# Patient Record
Sex: FEMALE | Race: WHITE | Employment: FULL TIME | URBAN - METROPOLITAN AREA
[De-identification: names, ages, dates, MRNs, and addresses within clinical notes are randomized per-mention and may not be internally consistent; named-entity substitution may affect disease eponyms.]

---

## 2018-12-24 ENCOUNTER — HOSPITAL ENCOUNTER (EMERGENCY)
Age: 23
Discharge: HOME | End: 2018-12-24
Attending: EMERGENCY MEDICINE
Payer: MEDICAID

## 2018-12-24 ENCOUNTER — APPOINTMENT (OUTPATIENT)
Dept: GENERAL RADIOLOGY | Age: 23
End: 2018-12-24
Attending: EMERGENCY MEDICINE
Payer: MEDICAID

## 2018-12-24 VITALS
RESPIRATION RATE: 16 BRPM | BODY MASS INDEX: 22.15 KG/M2 | SYSTOLIC BLOOD PRESSURE: 133 MMHG | HEIGHT: 63 IN | OXYGEN SATURATION: 97 % | HEART RATE: 86 BPM | DIASTOLIC BLOOD PRESSURE: 86 MMHG | TEMPERATURE: 97.5 F | WEIGHT: 125 LBS

## 2018-12-24 DIAGNOSIS — B34.9 VIRAL SYNDROME: Primary | ICD-10-CM

## 2018-12-24 DIAGNOSIS — R59.0 CERVICAL LYMPHADENOPATHY: ICD-10-CM

## 2018-12-24 LAB
BACTERIA URNS QL MICRO: ABNORMAL /HPF
BACTERIA URNS QL MICRO: ABNORMAL /HPF
CASTS URNS QL MICRO: ABNORMAL /LPF
CASTS URNS QL MICRO: ABNORMAL /LPF
CRYSTALS URNS QL MICRO: 0 /LPF
CRYSTALS URNS QL MICRO: 0 /LPF
EPI CELLS #/AREA URNS HPF: ABNORMAL /HPF
EPI CELLS #/AREA URNS HPF: ABNORMAL /HPF
HCG UR QL: NEGATIVE
MUCOUS THREADS URNS QL MICRO: 0 /LPF
MUCOUS THREADS URNS QL MICRO: ABNORMAL /LPF
RBC #/AREA URNS HPF: ABNORMAL /HPF
RBC #/AREA URNS HPF: ABNORMAL /HPF
WBC URNS QL MICRO: ABNORMAL /HPF
WBC URNS QL MICRO: ABNORMAL /HPF

## 2018-12-24 PROCEDURE — 96375 TX/PRO/DX INJ NEW DRUG ADDON: CPT | Performed by: EMERGENCY MEDICINE

## 2018-12-24 PROCEDURE — 81015 MICROSCOPIC EXAM OF URINE: CPT

## 2018-12-24 PROCEDURE — 81025 URINE PREGNANCY TEST: CPT

## 2018-12-24 PROCEDURE — 81003 URINALYSIS AUTO W/O SCOPE: CPT | Performed by: EMERGENCY MEDICINE

## 2018-12-24 PROCEDURE — 71046 X-RAY EXAM CHEST 2 VIEWS: CPT

## 2018-12-24 PROCEDURE — 74011250636 HC RX REV CODE- 250/636: Performed by: EMERGENCY MEDICINE

## 2018-12-24 PROCEDURE — 77030011943

## 2018-12-24 PROCEDURE — 99284 EMERGENCY DEPT VISIT MOD MDM: CPT | Performed by: EMERGENCY MEDICINE

## 2018-12-24 PROCEDURE — 96361 HYDRATE IV INFUSION ADD-ON: CPT | Performed by: EMERGENCY MEDICINE

## 2018-12-24 PROCEDURE — 51701 INSERT BLADDER CATHETER: CPT | Performed by: EMERGENCY MEDICINE

## 2018-12-24 PROCEDURE — 96374 THER/PROPH/DIAG INJ IV PUSH: CPT | Performed by: EMERGENCY MEDICINE

## 2018-12-24 RX ORDER — SODIUM CHLORIDE 9 MG/ML
1000 INJECTION, SOLUTION INTRAVENOUS ONCE
Status: COMPLETED | OUTPATIENT
Start: 2018-12-24 | End: 2018-12-24

## 2018-12-24 RX ORDER — KETOROLAC TROMETHAMINE 30 MG/ML
30 INJECTION, SOLUTION INTRAMUSCULAR; INTRAVENOUS
Status: DISCONTINUED | OUTPATIENT
Start: 2018-12-24 | End: 2018-12-24

## 2018-12-24 RX ORDER — ACETAMINOPHEN 325 MG/1
650 TABLET ORAL
Status: DISCONTINUED | OUTPATIENT
Start: 2018-12-24 | End: 2018-12-24 | Stop reason: HOSPADM

## 2018-12-24 RX ORDER — DIPHENHYDRAMINE HYDROCHLORIDE 50 MG/ML
25 INJECTION, SOLUTION INTRAMUSCULAR; INTRAVENOUS
Status: COMPLETED | OUTPATIENT
Start: 2018-12-24 | End: 2018-12-24

## 2018-12-24 RX ORDER — KETOROLAC TROMETHAMINE 30 MG/ML
15 INJECTION, SOLUTION INTRAMUSCULAR; INTRAVENOUS
Status: COMPLETED | OUTPATIENT
Start: 2018-12-24 | End: 2018-12-24

## 2018-12-24 RX ORDER — AMOXICILLIN 500 MG/1
500 TABLET, FILM COATED ORAL 3 TIMES DAILY
Qty: 30 TAB | Refills: 0 | Status: SHIPPED | OUTPATIENT
Start: 2018-12-24 | End: 2020-04-08

## 2018-12-24 RX ORDER — DEXAMETHASONE SODIUM PHOSPHATE 100 MG/10ML
10 INJECTION INTRAMUSCULAR; INTRAVENOUS
Status: COMPLETED | OUTPATIENT
Start: 2018-12-24 | End: 2018-12-24

## 2018-12-24 RX ORDER — AMOXICILLIN 250 MG/5ML
10 POWDER, FOR SUSPENSION ORAL 3 TIMES DAILY
Qty: 300 ML | Refills: 0 | Status: SHIPPED | OUTPATIENT
Start: 2018-12-24 | End: 2020-04-08

## 2018-12-24 RX ADMIN — SODIUM CHLORIDE 1000 ML: 900 INJECTION, SOLUTION INTRAVENOUS at 09:05

## 2018-12-24 RX ADMIN — KETOROLAC TROMETHAMINE 15 MG: 30 INJECTION, SOLUTION INTRAMUSCULAR at 09:03

## 2018-12-24 RX ADMIN — DEXAMETHASONE SODIUM PHOSPHATE 10 MG: 10 INJECTION INTRAMUSCULAR; INTRAVENOUS at 09:03

## 2018-12-24 RX ADMIN — DIPHENHYDRAMINE HYDROCHLORIDE 25 MG: 50 INJECTION, SOLUTION INTRAMUSCULAR; INTRAVENOUS at 12:17

## 2018-12-24 RX ADMIN — SODIUM CHLORIDE 1000 ML: 900 INJECTION, SOLUTION INTRAVENOUS at 11:09

## 2018-12-24 NOTE — ED TRIAGE NOTES
Patient states she was diagnosed with the flu Saturday and was given a prescription tamiflu but did not take it and is now feeling worse with pain and swelling to throat. Patient denies any difficulty swallowing, is handling secretions well and is speaking in full sentences.

## 2018-12-24 NOTE — DISCHARGE INSTRUCTIONS
Amoxicillin 3 times daily for 10 days. Tylenol 500 mg every 6 hours for pain. Ibuprofen 400 mg every 6 hours for pain. You may alternate these every 3 hours for best results. Make a saltwater gargle by placing 1 teaspoon of salt and baking soda into a 16 ounce bottle of water. Shake and gargle every hour or 2 as needed for sore throat pain. Increase fluids! Increase fluids! Over-the-counter saline nasal spray and over-the-counter sinus rinse for nasal congestion. Over-the-counter cough drops  And Mucinex DM for cough if needed. Follow-up with Dr. Meyer in 5-7 days if not improving. Return if any new, worsening or concerning symptoms. Lymphadenitis: Care Instructions  Your Care Instructions  Lymph nodes are small, bean-shaped glands throughout the body. They help the body fight germs and infections. Lymphadenitis is a swelling of a lymph node. It can be caused by an infection or other condition. The infection is most often in a nearby part of the body. A common example is the lumps on both sides of your neck under the jaw that get tender and bigger when you have a cold or sore throat. Sometimes the lymph node itself may be infected. Usually the swollen lymph nodes go back to normal size without a problem. Treatment, if needed, focuses on treating the cause. For example, a bacterial infection may be treated with antibiotics. This should bring the node back to normal size. An infection caused by a virus often goes away on its own. In rare cases, a badly infected node may need to be drained by your doctor. Follow-up care is a key part of your treatment and safety. Be sure to make and go to all appointments, and call your doctor if you are having problems. It's also a good idea to know your test results and keep a list of the medicines you take. How can you care for yourself at home? · Be safe with medicines. ? If your doctor prescribed antibiotics, take them as directed.  Do not stop taking them just because you feel better. You need to take the full course of antibiotics. ? Ask your doctor if you can take an over-the-counter pain medicine, such as acetaminophen (Tylenol), ibuprofen (Advil, Motrin), or naproxen (Aleve). Read and follow all instructions on the label. · If you have pain, try a warm compress. Soak a towel or washcloth in warm water. Wring it out, and place it on the affected skin. · Do not squeeze, drain, or puncture a painful lump. Doing this can irritate or inflame the lump, push any existing infection deeper into the skin, or cause severe bleeding. When should you call for help? Call your doctor now or seek immediate medical care if:    · Your lymph nodes get bigger.     · The area becomes red and feels more tender.     · You have a fever that does not go away.    Watch closely for changes in your health, and be sure to contact your doctor if:    · You do not get better as expected. Where can you learn more? Go to http://kennedy-nikita.info/. Enter P032 in the search box to learn more about \"Lymphadenitis: Care Instructions. \"  Current as of: November 18, 2017  Content Version: 11.8  © 8770-4732 [employer]. Care instructions adapted under license by [employer] (which disclaims liability or warranty for this information). If you have questions about a medical condition or this instruction, always ask your healthcare professional. [place] [address] any warranty or liability for your use of this information. Viral Infections: Care Instructions  Your Care Instructions    You don't feel well, but it's not clear what's causing it. You may have a viral infection. Viruses cause many illnesses, such as the common cold, influenza, fever, rashes, and the diarrhea, nausea, and vomiting that are often called \"stomach flu. \" You may wonder if antibiotic medicines could make you feel better.  But antibiotics only treat infections caused by bacteria. They don't work on viruses. The good news is that viral infections usually aren't serious. Most will go away in a few days without medical treatment. In the meantime, there are a few things you can do to make yourself more comfortable. Follow-up care is a key part of your treatment and safety. Be sure to make and go to all appointments, and call your doctor if you are having problems. It's also a good idea to know your test results and keep a list of the medicines you take. How can you care for yourself at home? · Get plenty of rest if you feel tired. · Take an over-the-counter pain medicine if needed, such as acetaminophen (Tylenol), ibuprofen (Advil, Motrin), or naproxen (Aleve). Read and follow all instructions on the label. · Be careful when taking over-the-counter cold or flu medicines and Tylenol at the same time. Many of these medicines have acetaminophen, which is Tylenol. Read the labels to make sure that you are not taking more than the recommended dose. Too much acetaminophen (Tylenol) can be harmful. · Drink plenty of fluids, enough so that your urine is light yellow or clear like water. If you have kidney, heart, or liver disease and have to limit fluids, talk with your doctor before you increase the amount of fluids you drink. · Stay home from work, school, and other public places while you have a fever. When should you call for help? Call 911 anytime you think you may need emergency care.  For example, call if:    · You have severe trouble breathing.     · You passed out (lost consciousness).    Call your doctor now or seek immediate medical care if:    · You seem to be getting much sicker.     · You have a new or higher fever.     · You have blood in your stools.     · You have new belly pain, or your pain gets worse.     · You have a new rash.    Watch closely for changes in your health, and be sure to contact your doctor if:    · You start to get better and then get worse.     · You do not get better as expected. Where can you learn more? Go to http://kennedy-nikita.info/. Enter I189 in the search box to learn more about \"Viral Infections: Care Instructions. \"  Current as of: November 18, 2017  Content Version: 11.8  © 7197-0542 [employer]. Care instructions adapted under license by [employer] (which disclaims liability or warranty for this information). If you have questions about a medical condition or this instruction, always ask your healthcare professional. [address] any warranty or liability for your use of this information.

## 2018-12-24 NOTE — ED NOTES
I have reviewed discharge instructions with the patient. The patient verbalized understanding. Patient left ED via Discharge Method: ambulatory to Home with family. Opportunity for questions and clarification provided. Patient given 1 scripts. To continue your aftercare when you leave the hospital, you may receive an automated call from our care team to check in on how you are doing. This is a free service and part of our promise to provide the best care and service to meet your aftercare needs.  If you have questions, or wish to unsubscribe from this service please call [phone]. Thank you for Choosing our [address] [place] [address] Emergency Department.

## 2018-12-24 NOTE — ED PROVIDER NOTES
ppatient began getting ill on Friday and was seen here Saturday and diagnosed with influenza. She did not take Tamiflu. Complains of worsening sore throat at this time and trouble breathing. Complains of swollen nodes in her neck. The history is provided by the patient. Sore Throat    This is a new problem. Patient reports a subjective fever - was not measured. Associated symptoms include congestion, shortness of breath and cough. Pertinent negatives include no vomiting. History reviewed. No pertinent past medical history. Past Surgical History:   Procedure Laterality Date    HX HEENT           History reviewed. No pertinent family history. Social History     Socioeconomic History    Marital status: SINGLE     Spouse name: Not on file    Number of children: Not on file    Years of education: Not on file    Highest education level: Not on file   Social Needs    Financial resource strain: Not on file    Food insecurity - worry: Not on file    Food insecurity - inability: Not on file    Transportation needs - medical: Not on file   [employer] needs - non-medical: Not on file   Occupational History    Not on file   Tobacco Use    Smoking status: Never Smoker    Smokeless tobacco: Never Used   Substance and Sexual Activity    Alcohol use: No     Frequency: Never    Drug use: No    Sexual activity: Not on file   Other Topics Concern    Not on file   Social History Narrative    Not on file         ALLERGIES: Sulfa (sulfonamide antibiotics)    Review of Systems   Constitutional: Positive for fever. HENT: Positive for congestion, rhinorrhea and sore throat. Respiratory: Positive for cough and shortness of breath. Gastrointestinal: Negative for vomiting. Skin: Negative for rash.        Vitals:    12/24/18 0844   BP: 132/83   Pulse: 89   Resp: 16   Temp: 97.5 °F (36.4 °C)   SpO2: 97%   Weight: 56.7 kg (125 lb)   Height: 5' 3\" (1.6 m)            Physical Exam Constitutional: She appears well-developed and well-nourished. HENT:   Mouth/Throat: Mucous membranes are normal. No trismus in the jaw. No uvula swelling. Posterior oropharyngeal erythema present. No posterior oropharyngeal edema or tonsillar abscesses. Eyes: Conjunctivae are normal. Pupils are equal, round, and reactive to light. Cardiovascular: Normal rate, regular rhythm and normal heart sounds. Pulmonary/Chest: Effort normal and breath sounds normal.   Abdominal: Soft. She exhibits no distension. There is no tenderness. Musculoskeletal: She exhibits no edema. Neurological: She is alert. Nursing note and vitals reviewed. MDM  Number of Diagnoses or Management Options  Diagnosis management comments: Decadron for lymphadenopathy. Toradol for pain. IV hydration and sore throat and probable poor by mouth intake. Chest x-ray to exclude bacterial pneumonia with complaint of shortness of breath. Vitals stable. No sign of peritonsillar abscess. 12:24 PM  Repeat catheter urine specimen negative for UTI.        Amount and/or Complexity of Data Reviewed  Tests in the radiology section of CPT®: ordered and reviewed           Procedures

## 2018-12-24 NOTE — ED NOTES
I have reviewed discharge instructions with the patient and spouse. The patient and spouse verbalized understanding. Patient left ED via Discharge Method: ambulatory to Home with family. Opportunity for questions and clarification provided. Patient given 1 scripts. No e-sign. To continue your aftercare when you leave the hospital, you may receive an automated call from our care team to check in on how you are doing. This is a free service and part of our promise to provide the best care and service to meet your aftercare needs.  If you have questions, or wish to unsubscribe from this service please call [phone]. Thank you for Choosing our [facility] Emergency Department.

## 2019-10-02 ENCOUNTER — APPOINTMENT (OUTPATIENT)
Dept: GENERAL RADIOLOGY | Age: 24
End: 2019-10-02
Attending: EMERGENCY MEDICINE
Payer: MEDICAID

## 2019-10-02 ENCOUNTER — HOSPITAL ENCOUNTER (EMERGENCY)
Age: 24
Discharge: HOME | End: 2019-10-03
Attending: EMERGENCY MEDICINE
Payer: MEDICAID

## 2019-10-02 DIAGNOSIS — M24.411 RECURRENT SHOULDER DISLOCATION, RIGHT: Primary | ICD-10-CM

## 2019-10-02 LAB — GLUCOSE BLD STRIP.AUTO-MCNC: 103 MG/DL (ref 65–100)

## 2019-10-02 PROCEDURE — L3650 SO 8 ABD RESTRAINT PRE OTS: HCPCS | Performed by: EMERGENCY MEDICINE

## 2019-10-02 PROCEDURE — 73020 X-RAY EXAM OF SHOULDER: CPT

## 2019-10-02 PROCEDURE — 82962 GLUCOSE BLOOD TEST: CPT

## 2019-10-02 PROCEDURE — 96376 TX/PRO/DX INJ SAME DRUG ADON: CPT | Performed by: EMERGENCY MEDICINE

## 2019-10-02 PROCEDURE — 73030 X-RAY EXAM OF SHOULDER: CPT

## 2019-10-02 PROCEDURE — 74011250636 HC RX REV CODE- 250/636: Performed by: EMERGENCY MEDICINE

## 2019-10-02 PROCEDURE — 99152 MOD SED SAME PHYS/QHP 5/>YRS: CPT | Performed by: EMERGENCY MEDICINE

## 2019-10-02 PROCEDURE — 96374 THER/PROPH/DIAG INJ IV PUSH: CPT | Performed by: EMERGENCY MEDICINE

## 2019-10-02 PROCEDURE — 96375 TX/PRO/DX INJ NEW DRUG ADDON: CPT | Performed by: EMERGENCY MEDICINE

## 2019-10-02 PROCEDURE — 99285 EMERGENCY DEPT VISIT HI MDM: CPT | Performed by: EMERGENCY MEDICINE

## 2019-10-02 PROCEDURE — 75810000301 HC ER LEVEL 1 CLOSED TREATMNT FRACTURE/DISLOCATION: Performed by: EMERGENCY MEDICINE

## 2019-10-02 RX ORDER — MORPHINE SULFATE 4 MG/ML
4 INJECTION INTRAVENOUS
Status: COMPLETED | OUTPATIENT
Start: 2019-10-02 | End: 2019-10-02

## 2019-10-02 RX ORDER — ONDANSETRON 2 MG/ML
4 INJECTION INTRAMUSCULAR; INTRAVENOUS
Status: COMPLETED | OUTPATIENT
Start: 2019-10-02 | End: 2019-10-02

## 2019-10-02 RX ORDER — KETOROLAC TROMETHAMINE 10 MG/1
10 TABLET, FILM COATED ORAL
Qty: 15 TAB | Refills: 0 | Status: SHIPPED | OUTPATIENT
Start: 2019-10-02 | End: 2020-04-08

## 2019-10-02 RX ORDER — PROPOFOL 10 MG/ML
100 INJECTION, EMULSION INTRAVENOUS
Status: COMPLETED | OUTPATIENT
Start: 2019-10-02 | End: 2019-10-02

## 2019-10-02 RX ORDER — FENTANYL CITRATE 50 UG/ML
75 INJECTION, SOLUTION INTRAMUSCULAR; INTRAVENOUS
Status: COMPLETED | OUTPATIENT
Start: 2019-10-02 | End: 2019-10-02

## 2019-10-02 RX ADMIN — ONDANSETRON 4 MG: 2 INJECTION INTRAMUSCULAR; INTRAVENOUS at 23:49

## 2019-10-02 RX ADMIN — PROPOFOL 30 MG: 10 INJECTION, EMULSION INTRAVENOUS at 22:55

## 2019-10-02 RX ADMIN — MORPHINE SULFATE 4 MG: 4 INJECTION INTRAVENOUS at 22:08

## 2019-10-02 RX ADMIN — FENTANYL CITRATE 75 MCG: 50 INJECTION INTRAMUSCULAR; INTRAVENOUS at 22:45

## 2019-10-02 RX ADMIN — ONDANSETRON 4 MG: 2 INJECTION INTRAMUSCULAR; INTRAVENOUS at 22:07

## 2019-10-02 RX ADMIN — PROPOFOL 30 MG: 10 INJECTION, EMULSION INTRAVENOUS at 23:00

## 2019-10-02 NOTE — LETTER
[address] [address] EMERGENCY DEPT 
[address] [facility] [id number] 
[phone] Work/School Note Date: 10/2/2019 To Whom It May concern: 
 
Dionicio Cody was seen and treated today in the emergency room by the following provider(s): 
Attending Provider: Pacheco Carroll MD. Dionicio Cody may return to work on Sunday October 6th 2019. Sincerely, Nayla Way RN

## 2019-10-03 VITALS
SYSTOLIC BLOOD PRESSURE: 112 MMHG | HEIGHT: 63 IN | OXYGEN SATURATION: 99 % | BODY MASS INDEX: 25.69 KG/M2 | RESPIRATION RATE: 18 BRPM | WEIGHT: 145 LBS | DIASTOLIC BLOOD PRESSURE: 75 MMHG | HEART RATE: 84 BPM | TEMPERATURE: 98.4 F

## 2019-10-03 NOTE — ED NOTES
Patient sedated with reduction with no significant issues. Patient tolerated sedation well (propophol as given by MD per [facility]) with easy reduction by MD with assistance from Kassandra Jeter. Patient with no significant drop in oxygen saturation, capnography or BP. Shoulder immobilizer placed. Patient restful in bed at current awaiting end of sedation effects.

## 2019-10-03 NOTE — DISCHARGE INSTRUCTIONS
Sedation for a Medical Procedure: Care Instructions    You were given a sedative medication during your ED visit. While many of the effects will have worn   off before you leave; you may continue to feel some effects for several hours. Common side effects from sedation include:  Feeling sleepy. (Your doctors and nurses will make sure you are not too sleepy to go home.)  Nausea and vomiting. This usually does not last long. Feeling tired. How can you care for yourself at home? Activity    Don't do anything for 24 hours that requires attention to detail. It takes time for the medicine effects to completely wear off. Do not make important legal decisions for 24 hours. Do not sign any legal documents for 24 hours. Do not drink alcohol today     For your safety, you should not drive or operate heavy machinery for the remainder of the day     Rest when you feel tired. Getting enough sleep will help you recover. Diet    You can eat your normal diet, unless your doctor gives you other instructions. If your stomach is upset, try clear liquids and bland, low-fat foods like plain toast or rice. Drink plenty of fluids (unless your doctor tells you not to). Don't drink alcohol for 24 hours. Medicines    Be safe with medicines. Read and follow all instructions on the label. If the doctor gave you a prescription medicine for pain, take it as prescribed. If you are not taking a prescription pain medicine, ask your doctor if you can take an over-the-counter medicine. If you think your pain medicine is making you sick to your stomach: Take your medicine after meals (unless your doctor has told you not to). Ask your doctor for a different pain medicine. When should you call for help? Call 911 anytime you think you may need emergency care. For example, call if:    You have severe trouble breathing. You passed out (lost consciousness).       Call your doctor now or seek immediate medical care if:    You have trouble breathing. You have ongoing or worsening nausea or vomiting. You have a fever. You have a new or worse headache. The medicine is not wearing off and you can't think clearly. Watch closely for changes in your health, and be sure to contact your doctor if:    You do not get better as expected. Follow-up care is a key part of your treatment and safety. Be sure to make and go to all   appointments, and call your doctor if you are having problems. It's also a good idea to know your   test results and keep a list of the medicines you take. Where can you learn more? Go to http://kennedy-nikita.info/. \

## 2019-10-03 NOTE — ED PROVIDER NOTES
[address] Alvino Pressley is a 30 y.o. female seen on 10/2/2019 at 10:39 PM in the [facility] EMERGENCY DEPT in room FT10/10. Chief Complaint   Patient presents with    Shoulder Injury     HPI: 49-year-old female presenting to the emergency department for right shoulder dislocation. She states today she was walking on uneven ground when she fell. She fell on her outstretched arm and immediately felt displacement of her humeral head. She has a history of multiple dislocations in the past.  She has had rotator cuff surgery in the past as well. She is from out of town but recently moved to the [place]. She states that since her surgery she is usually been able to relocate her shoulder at home by herself, however today she is been unable to do it. Her shoulders been out for approximately 1 hour. She denies any numbness or tingling. She has decreased range of motion her right shoulder. Pain is worse with attempted movement. She denies any other injury from the fall. Historian: Patient    REVIEW OF SYSTEMS     Review of Systems   Constitutional: Negative for fever. HENT: Negative. Eyes: Negative. Respiratory: Negative for cough, chest tightness, shortness of breath and wheezing. Cardiovascular: Negative for chest pain. Gastrointestinal: Negative for abdominal distention, abdominal pain, constipation, diarrhea and vomiting. Endocrine: Negative. Genitourinary: Negative for dysuria, flank pain, frequency and urgency. Neurological: Negative for dizziness, syncope and headaches. Psychiatric/Behavioral: Negative. All other systems reviewed and are negative. PAST MEDICAL HISTORY     No past medical history on file.   Past Surgical History:   Procedure Laterality Date    HX HEENT       Social History     Socioeconomic History    Marital status:      Spouse name: Not on file    Number of children: Not on file    Years of education: Not on file    Highest education level: Not on file   Tobacco Use    Smoking status: Never Smoker    Smokeless tobacco: Never Used   Substance and Sexual Activity    Alcohol use: No     Frequency: Never    Drug use: No     Prior to Admission Medications   Prescriptions Last Dose Informant Patient Reported? Taking?   amoxicillin (AMOXIL) 250 mg/5 mL suspension   No No   Sig: Take 10 mL by mouth three (3) times daily. amoxicillin 500 mg tab   No No   Sig: Take 500 mg by mouth three (3) times daily. Facility-Administered Medications: None     Allergies   Allergen Reactions    Peanuts [Peanut Oil] Hives    Sulfa (Sulfonamide Antibiotics) Hives        PHYSICAL EXAM       Vitals:    10/02/19 2137   BP: 111/63   Pulse: (!) 116   Resp: 20   Temp: 98.4 °F (36.9 °C)   SpO2: 100%    Vital signs were reviewed. Physical Exam   Constitutional: She is oriented to person, place, and time. She appears well-developed and well-nourished. No distress. HENT:   Head: Normocephalic and atraumatic. Eyes: Pupils are equal, round, and reactive to light. EOM are normal.   Neck: Normal range of motion. Neck supple. Cardiovascular: Normal rate, regular rhythm, normal heart sounds and intact distal pulses. Exam reveals no gallop and no friction rub. No murmur heard. Pulmonary/Chest: Effort normal and breath sounds normal. No stridor. No respiratory distress. She has no wheezes. Abdominal: Soft. Bowel sounds are normal. She exhibits no distension and no mass. There is no tenderness. There is no rebound and no guarding. Musculoskeletal: She exhibits no edema. Right shoulder: She exhibits decreased range of motion, tenderness (anterior AC ), bony tenderness, deformity (loss of rounding of shoulder) and pain. She exhibits no swelling, no effusion, no crepitus, no spasm, normal pulse and normal strength.         Right elbow: Normal.       Right wrist: Normal.   Patient intact in all distributions of the right hand, 5+ strength  strength right hand, range of motion at the elbow is normal   Neurological: She is alert and oriented to person, place, and time. No sensory deficit. No focal neuro deficits   Skin: Skin is warm and dry. No rash noted. She is not diaphoretic. No erythema. Psychiatric: She has a normal mood and affect. Her behavior is normal.   Vitals reviewed. MEDICAL DECISION MAKING     MDM  Procedural Sedation  Date/Time: 10/2/2019 10:41 PM  Performed by: Kristian Oneil MD  Authorized by: Kristian Oneil MD     Consent:     Consent obtained:  Verbal and written    Consent given by:  Patient    Risks discussed:   Allergic reaction, prolonged hypoxia resulting in organ damage, prolonged sedation necessitating reversal, dysrhythmia, inadequate sedation, respiratory compromise necessitating ventilatory assistance and intubation, vomiting and nausea    Alternatives discussed:  Analgesia without sedation, anxiolysis and regional anesthesia  Indications:     Procedure performed:  Dislocation reduction    Procedure necessitating sedation performed by:  Physician performing sedation    Intended level of sedation:  Moderate (conscious sedation)  Pre-sedation assessment:     ASA classification: class 1 - normal, healthy patient      Neck mobility: normal      Mouth opening:  3 or more finger widths    Thyromental distance:  4 finger widths    Mallampati score:  I - soft palate, uvula, fauces, pillars visible    Pre-sedation assessments completed and reviewed: airway patency, cardiovascular function, mental status, nausea/vomiting, respiratory function and temperature      History of difficult intubation: no    Immediate pre-procedure details:     Reviewed: vital signs, relevant labs/tests and NPO status      Verified: bag valve mask available, emergency equipment available, intubation equipment available, IV patency confirmed, oxygen available and suction available    Procedure details (see MAR for exact dosages):     Preoxygenation:  Nasal cannula    Sedation:  Propofol    Analgesia:  Fentanyl    Intra-procedure monitoring:  Blood pressure monitoring, cardiac monitor, continuous capnometry, continuous pulse oximetry, frequent LOC assessments and frequent vital sign checks    Intra-procedure events: none    Post-procedure details:     Attendance: Constant attendance by certified staff until patient recovered      Recovery: Patient returned to pre-procedure baseline      Post-sedation assessments completed and reviewed: airway patency, cardiovascular function, hydration status, mental status, nausea/vomiting, pain level and respiratory function      Patient is stable for discharge or admission: yes      Patient tolerance: Tolerated well, no immediate complications    Reduction of Joint  Date/Time: 10/12/2019 7:18 AM  Performed by: Maikel Joel MD  Authorized by: Maikel Joel MD     Consent:     Consent obtained:  Written    Consent given by:  Patient    Risks discussed:  Nerve damage, pain and vascular damage    Alternatives discussed:  No treatment and delayed treatment  Injury:     Injury location:  Shoulder    Shoulder injury location:  R shoulder    Hill-Sachs deformity: no    Pre-procedure assessment:     Neurological function: normal      Distal perfusion: normal      Range of motion: normal    Sedation:     Sedation type: Moderate (conscious) sedation  Procedure details:     Manipulation performed: yes      Skeletal traction used: yes      Reduction successful: yes      X-ray confirmed reduction: yes      Immobilization:  Sling  Post-procedure assessment:     Neurological function: normal      Distal perfusion: normal      Range of motion: normal      Patient tolerance of procedure: Tolerated well, no immediate complications      ED Course: Moderate sedation performed and shoulder was reduced without complication. Postreduction film will be obtained.     11:24 PM  Post reduction film shows appropriately relocated shoulder joint    Disposition:  Dc home  Diagnosis:  R shoulder dislocation  ____________________________________________________________________  A portion of this note was generated using voice recognition dictation software. While the note has been reviewed for accuracy, please note certain words and phrases may not be transcribed as intended and some grammatical and/or typographical errors may be present.

## 2019-10-03 NOTE — ED NOTES
Patient in obvious distress, awaiting MD hargrove. PIV placed on standing order. Order for 4mg morphine, 4mg zofran given by Nicho Trimble. Will administer ahead of probably conscious sedation for reduction.

## 2019-10-03 NOTE — ED TRIAGE NOTES
Pt presents to the ED with L shoulder pain,  Reports she had rotator cuff surgery x 1 year ago and has had dislocation before to shoulder post surgery.

## 2019-10-03 NOTE — ED NOTES
Patient off floor to XR. Patient states unable to void but hasn't been sexually active in many months and is sure she isn't pregnant. Pt states she'll sign waiver for radiology.

## 2019-10-03 NOTE — ED NOTES
I have reviewed discharge instructions with the patient. The patient verbalized understanding. Patient to follow up with ortho as referred and RTED with any changes/concerns. Patient expresses understanding. Patient assisted to wheelchair to vehicle. Patient's friend to drive her home. Patient advised she needs to wear shoulder immobilizer until seen by ortho. Patient expresses understanding. Patient from ED in NAD with Rx 1.

## 2019-10-03 NOTE — ED NOTES
First contact with patient. Patient reports a fall resulting in dislocated shoulder. Patient advises this has been a frequent occurrence since rotator cuff surgery. States she can usually self reduce but has been unable to.

## 2019-10-03 NOTE — ED NOTES
Patient with some nausea post sedation- medicated with additional 4mg zofran. Otherwise, patient continues to become more awake/alert. VSS remain stable. Will plan to discharge in appx 10-15 minutes assuming no changes.

## 2019-12-03 ENCOUNTER — HOSPITAL ENCOUNTER (EMERGENCY)
Age: 24
Discharge: HOME | End: 2019-12-03
Payer: MEDICAID

## 2019-12-03 VITALS
HEART RATE: 89 BPM | WEIGHT: 140 LBS | BODY MASS INDEX: 24.8 KG/M2 | TEMPERATURE: 97.7 F | OXYGEN SATURATION: 97 % | DIASTOLIC BLOOD PRESSURE: 80 MMHG | RESPIRATION RATE: 16 BRPM | HEIGHT: 63 IN | SYSTOLIC BLOOD PRESSURE: 120 MMHG

## 2019-12-03 DIAGNOSIS — N30.00 ACUTE CYSTITIS WITHOUT HEMATURIA: Primary | ICD-10-CM

## 2019-12-03 LAB
BACTERIA URNS QL MICRO: ABNORMAL /HPF
CASTS URNS QL MICRO: 0 /LPF
CRYSTALS URNS QL MICRO: 0 /LPF
EPI CELLS #/AREA URNS HPF: ABNORMAL /HPF
MUCOUS THREADS URNS QL MICRO: 0 /LPF
OTHER OBSERVATIONS,UCOM: ABNORMAL
RBC #/AREA URNS HPF: ABNORMAL /HPF
WBC URNS QL MICRO: ABNORMAL /HPF

## 2019-12-03 PROCEDURE — 51701 INSERT BLADDER CATHETER: CPT

## 2019-12-03 PROCEDURE — 81015 MICROSCOPIC EXAM OF URINE: CPT

## 2019-12-03 PROCEDURE — 77030011943

## 2019-12-03 PROCEDURE — 74011250637 HC RX REV CODE- 250/637

## 2019-12-03 PROCEDURE — 99283 EMERGENCY DEPT VISIT LOW MDM: CPT

## 2019-12-03 RX ORDER — HYDROCODONE BITARTRATE AND ACETAMINOPHEN 5; 325 MG/1; MG/1
1 TABLET ORAL
Qty: 6 TAB | Refills: 0 | Status: SHIPPED | OUTPATIENT
Start: 2019-12-03 | End: 2019-12-05

## 2019-12-03 RX ORDER — PHENAZOPYRIDINE HYDROCHLORIDE 95 MG/1
200 TABLET ORAL
Status: COMPLETED | OUTPATIENT
Start: 2019-12-03 | End: 2019-12-03

## 2019-12-03 RX ORDER — AMOXICILLIN AND CLAVULANATE POTASSIUM 875; 125 MG/1; MG/1
1 TABLET, FILM COATED ORAL 2 TIMES DAILY
Qty: 14 TAB | Refills: 0 | Status: SHIPPED | OUTPATIENT
Start: 2019-12-03 | End: 2020-04-08

## 2019-12-03 RX ADMIN — URINARY PAIN RELIEF 190 MG: 95 TABLET ORAL at 05:50

## 2019-12-03 NOTE — ED NOTES
I have reviewed discharge instructions with the patient. The patient verbalized understanding. Patient left ED via Discharge Method: ambulatory to Home with self. Opportunity for questions and clarification provided. Patient given 2 scripts. To continue your aftercare when you leave the hospital, you may receive an automated call from our care team to check in on how you are doing. This is a free service and part of our promise to provide the best care and service to meet your aftercare needs.  If you have questions, or wish to unsubscribe from this service please call [phone]. Thank you for Choosing our [facility] Emergency Department.

## 2019-12-03 NOTE — DISCHARGE INSTRUCTIONS

## 2019-12-03 NOTE — ED TRIAGE NOTES
Pt arrives reporting possible uti. States feeling the need to urinate, but unable to get much out and dysuria. States it is uncomfortable to sit down. Reports symptoms started yesterday. Pt reports hx kidney stones, and stent in kidney but unsure which one. States stent was place din 2018 and has not had a stone since then.

## 2019-12-03 NOTE — ED NOTES
Pt crying in room. Straight cath performed by this RN with assistance of PRATEEK Yung. When catheter inserted pt screamed and started rolling over in bed. Minimal amount of urine obtained. Sent to lab.

## 2019-12-04 NOTE — ED PROVIDER NOTES
53-year-old female complaining of pain with urination. Patient started having symptoms 2 days ago but does get the point where she is having urgency frequency and burning upon urination. Patient has had several UTIs in the past and states it feels the same. Bladder Infection    This is a recurrent problem. The current episode started more than 2 days ago. The problem occurs every urination. The pain is at a severity of 10/10. The pain is severe. There has been no fever. She is sexually active. Pertinent negatives include no chills. No past medical history on file. Past Surgical History:   Procedure Laterality Date    HX HEENT           No family history on file.     Social History     Socioeconomic History    Marital status:      Spouse name: Not on file    Number of children: Not on file    Years of education: Not on file    Highest education level: Not on file   Occupational History    Not on file   Social Needs    Financial resource strain: Not on file    Food insecurity:     Worry: Not on file     Inability: Not on file    Transportation needs:     Medical: Not on file     Non-medical: Not on file   Tobacco Use    Smoking status: Never Smoker    Smokeless tobacco: Never Used   Substance and Sexual Activity    Alcohol use: No     Frequency: Never    Drug use: No    Sexual activity: Not on file   Lifestyle    Physical activity:     Days per week: Not on file     Minutes per session: Not on file    Stress: Not on file   Relationships    Social connections:     Talks on phone: Not on file     Gets together: Not on file     Attends Jehovah's witness service: Not on file     Active member of club or organization: Not on file     Attends meetings of clubs or organizations: Not on file     Relationship status: Not on file    Intimate partner violence:     Fear of current or ex partner: Not on file     Emotionally abused: Not on file     Physically abused: Not on file     Forced sexual activity: Not on file   Other Topics Concern    Not on file   Social History Narrative    Not on file         ALLERGIES: Peanuts [peanut oil] and Sulfa (sulfonamide antibiotics)    Review of Systems   Constitutional: Negative. Negative for activity change and chills. HENT: Negative. Eyes: Negative. Respiratory: Negative. Cardiovascular: Negative. Gastrointestinal: Negative. Genitourinary: Negative. Musculoskeletal: Negative. Skin: Negative. Neurological: Negative. Psychiatric/Behavioral: Negative. All other systems reviewed and are negative. Vitals:    12/03/19 0517 12/03/19 0643   BP: 122/77 120/80   Pulse: 89    Resp: 18 16   Temp: 97.7 °F (36.5 °C) 97.7 °F (36.5 °C)   SpO2: 96% 97%   Weight: 63.5 kg (140 lb)    Height: 5' 3\" (1.6 m)             Physical Exam  Vitals signs and nursing note reviewed. Constitutional:       General: She is not in acute distress. Appearance: She is well-developed. She is not diaphoretic. HENT:      Head: Normocephalic and atraumatic. Right Ear: External ear normal.      Left Ear: External ear normal.      Nose: Nose normal.      Mouth/Throat:      Pharynx: No oropharyngeal exudate. Eyes:      General: No scleral icterus. Right eye: No discharge. Left eye: No discharge. Conjunctiva/sclera: Conjunctivae normal.      Pupils: Pupils are equal, round, and reactive to light. Neck:      Musculoskeletal: Normal range of motion and neck supple. Vascular: No JVD. Trachea: No tracheal deviation. Cardiovascular:      Rate and Rhythm: Normal rate and regular rhythm. Pulmonary:      Effort: Pulmonary effort is normal. No respiratory distress. Breath sounds: Normal breath sounds. No stridor. No wheezing. Chest:      Chest wall: No tenderness. Abdominal:      General: Bowel sounds are normal. There is no distension. Palpations: Abdomen is soft. There is no mass. Tenderness:  There is no tenderness. Musculoskeletal: Normal range of motion. General: No tenderness. Skin:     General: Skin is warm and dry. Coloration: Skin is not pale. Findings: No erythema or rash. Neurological:      Mental Status: She is alert and oriented to person, place, and time. Cranial Nerves: No cranial nerve deficit. Psychiatric:         Behavior: Behavior normal.         Thought Content: Thought content normal.          MDM  Number of Diagnoses or Management Options  Acute cystitis without hematuria:   Diagnosis management comments: Patient had difficulty giving urine straight cath was attempted small amount of urine returned. Positive for UTI will treat with antibiotics and Pyridium.        Amount and/or Complexity of Data Reviewed  Clinical lab tests: reviewed and ordered           Procedures

## 2020-04-08 ENCOUNTER — HOSPITAL ENCOUNTER (EMERGENCY)
Age: 25
Discharge: HOME | End: 2020-04-08
Attending: EMERGENCY MEDICINE
Payer: MEDICAID

## 2020-04-08 VITALS
HEART RATE: 87 BPM | OXYGEN SATURATION: 97 % | TEMPERATURE: 97.6 F | SYSTOLIC BLOOD PRESSURE: 120 MMHG | RESPIRATION RATE: 16 BRPM | DIASTOLIC BLOOD PRESSURE: 74 MMHG

## 2020-04-08 DIAGNOSIS — N39.0 URINARY TRACT INFECTION WITHOUT HEMATURIA, SITE UNSPECIFIED: Primary | ICD-10-CM

## 2020-04-08 LAB
BACTERIA URNS QL MICRO: ABNORMAL /HPF
CASTS URNS QL MICRO: 0 /LPF
CRYSTALS URNS QL MICRO: ABNORMAL /LPF
EPI CELLS #/AREA URNS HPF: ABNORMAL /HPF
HCG UR QL: NEGATIVE
MUCOUS THREADS URNS QL MICRO: ABNORMAL /LPF
RBC #/AREA URNS HPF: ABNORMAL /HPF
WBC URNS QL MICRO: ABNORMAL /HPF

## 2020-04-08 PROCEDURE — 81025 URINE PREGNANCY TEST: CPT

## 2020-04-08 PROCEDURE — 81003 URINALYSIS AUTO W/O SCOPE: CPT

## 2020-04-08 PROCEDURE — 99284 EMERGENCY DEPT VISIT MOD MDM: CPT

## 2020-04-08 PROCEDURE — 81015 MICROSCOPIC EXAM OF URINE: CPT

## 2020-04-08 PROCEDURE — 74011250637 HC RX REV CODE- 250/637: Performed by: PHYSICIAN ASSISTANT

## 2020-04-08 PROCEDURE — 99285 EMERGENCY DEPT VISIT HI MDM: CPT

## 2020-04-08 RX ORDER — PHENAZOPYRIDINE HYDROCHLORIDE 95 MG/1
200 TABLET ORAL
Status: COMPLETED | OUTPATIENT
Start: 2020-04-08 | End: 2020-04-08

## 2020-04-08 RX ORDER — CEPHALEXIN 500 MG/1
500 CAPSULE ORAL 4 TIMES DAILY
Qty: 20 CAP | Refills: 0 | Status: SHIPPED | OUTPATIENT
Start: 2020-04-08 | End: 2020-04-13

## 2020-04-08 RX ORDER — PHENAZOPYRIDINE HYDROCHLORIDE 200 MG/1
200 TABLET, FILM COATED ORAL 3 TIMES DAILY
Qty: 6 TAB | Refills: 0 | Status: SHIPPED | OUTPATIENT
Start: 2020-04-08 | End: 2020-04-10

## 2020-04-08 RX ADMIN — URINARY PAIN RELIEF 190 MG: 95 TABLET ORAL at 12:18

## 2020-04-08 NOTE — PROGRESS NOTES
SW met with patient who confirmed demographics. States that she does not have a PCP but would like to become established. Referral to [facility].      Joe Knowles, [address] [facility]    [address] [facility]    * [email or web address]    ( [phone]

## 2020-04-08 NOTE — ED TRIAGE NOTES
Pt received mask upon arrival to ED. Pt states urinary pain and difficulty urinating that started around 0100 this morning. States she believes she has a UTI. States fatigue and nausea. No vomiting/fever/back pain.

## 2020-04-08 NOTE — ED NOTES
I have reviewed discharge instructions with the patient. The patient verbalized understanding. Patient left ED via Discharge Method: ambulatory to Home with self. Opportunity for questions and clarification provided. Patient given 2 scripts. To continue your aftercare when you leave the hospital, you may receive an automated call from our care team to check in on how you are doing. This is a free service and part of our promise to provide the best care and service to meet your aftercare needs.  If you have questions, or wish to unsubscribe from this service please call [phone]. Thank you for Choosing our [facility] Emergency Department.

## 2020-04-08 NOTE — ED PROVIDER NOTES
Patient states she woke about 1 AM with dysuria, no fever she does have some lower abdominal cramping. History of UTIs no history of diabetes    The history is provided by the patient. Urinary Pain    This is a new problem. The current episode started 6 to 12 hours ago. The problem occurs every urination. The problem has not changed since onset. The quality of the pain is described as aching. The pain is at a severity of 8/10. The pain is mild. There has been no fever. She is sexually active. Associated symptoms include frequency and urgency. Pertinent negatives include no hematuria and no back pain. The patient is not pregnant. She has tried nothing for the symptoms. The treatment provided no relief. Her past medical history is significant for recurrent UTIs. No past medical history on file. Past Surgical History:   Procedure Laterality Date    HX HEENT           No family history on file.     Social History     Socioeconomic History    Marital status:      Spouse name: Not on file    Number of children: Not on file    Years of education: Not on file    Highest education level: Not on file   Occupational History    Not on file   Social Needs    Financial resource strain: Not on file    Food insecurity     Worry: Not on file     Inability: Not on file    Transportation needs     Medical: Not on file     Non-medical: Not on file   Tobacco Use    Smoking status: Never Smoker    Smokeless tobacco: Never Used   Substance and Sexual Activity    Alcohol use: No     Frequency: Never    Drug use: No    Sexual activity: Not on file   Lifestyle    Physical activity     Days per week: Not on file     Minutes per session: Not on file    Stress: Not on file   Relationships    Social connections     Talks on phone: Not on file     Gets together: Not on file     Attends Buddhist service: Not on file     Active member of club or organization: Not on file     Attends meetings of clubs or organizations: Not on file     Relationship status: Not on file    Intimate partner violence     Fear of current or ex partner: Not on file     Emotionally abused: Not on file     Physically abused: Not on file     Forced sexual activity: Not on file   Other Topics Concern    Not on file   Social History Narrative    Not on file         ALLERGIES: Peanuts [peanut oil] and Sulfa (sulfonamide antibiotics)    Review of Systems   Genitourinary: Positive for frequency and urgency. Negative for hematuria. Musculoskeletal: Negative for back pain. All other systems reviewed and are negative. Vitals:    04/08/20 1156   BP: 121/89   Pulse: 97   Resp: 15   Temp: 97.6 °F (36.4 °C)   SpO2: 95%            Physical Exam  Vitals signs and nursing note reviewed. Constitutional:       General: She is not in acute distress. Appearance: Normal appearance. She is well-developed and normal weight. She is not diaphoretic. HENT:      Head: Normocephalic and atraumatic. Nose: Nose normal.      Mouth/Throat:      Mouth: Mucous membranes are moist.   Eyes:      Pupils: Pupils are equal, round, and reactive to light. Neck:      Musculoskeletal: Normal range of motion and neck supple. Cardiovascular:      Rate and Rhythm: Normal rate and regular rhythm. Pulmonary:      Effort: Pulmonary effort is normal.      Breath sounds: Normal breath sounds. Abdominal:      General: Abdomen is flat. Bowel sounds are normal.      Palpations: Abdomen is soft. Comments: Suprapubic area   Musculoskeletal: Normal range of motion. Skin:     General: Skin is warm. Findings: No rash. Neurological:      General: No focal deficit present. Mental Status: She is alert and oriented to person, place, and time.    Psychiatric:         Mood and Affect: Mood normal.         Behavior: Behavior normal.          MDM  Number of Diagnoses or Management Options  Diagnosis management comments: Urine with  white cells and +3 bacteria  Treat UTI       Amount and/or Complexity of Data Reviewed  Clinical lab tests: ordered and reviewed  Review and summarize past medical records: yes    Risk of Complications, Morbidity, and/or Mortality  Presenting problems: low  Diagnostic procedures: low  Management options: low    Patient Progress  Patient progress: improved         Procedures

## 2020-04-08 NOTE — LETTER
[address] [address] EMERGENCY DEPT 
[address] [facility] 
[facility] [address] 
[phone] Work/School Note Date: 4/8/2020 To Whom It May concern: 
 
Ankita Finch was seen and treated today in the emergency room by the following provider(s): 
Attending Provider: Nadeen Charles MD 
Physician Assistant: KODAK Hawk. Ankita Finch may return to work on 4-9-20. Sincerely, KODAK Greenwood

## 2020-04-08 NOTE — ED NOTES
Pt given water to help obtain more urine. Urine specimen provided upon triage was too small for urine dip/micro/preg.

## 2020-04-08 NOTE — DISCHARGE INSTRUCTIONS
Meds as directed, drink plenty of water, get up and urinate after sex, call office to arrange follow-up appointment

## 2020-10-07 ENCOUNTER — HOSPITAL ENCOUNTER (EMERGENCY)
Age: 25
Discharge: HOME | End: 2020-10-07
Attending: EMERGENCY MEDICINE
Payer: MEDICAID

## 2020-10-07 VITALS
OXYGEN SATURATION: 97 % | WEIGHT: 150 LBS | HEIGHT: 64 IN | SYSTOLIC BLOOD PRESSURE: 121 MMHG | DIASTOLIC BLOOD PRESSURE: 56 MMHG | BODY MASS INDEX: 25.61 KG/M2 | HEART RATE: 82 BPM | TEMPERATURE: 98.1 F | RESPIRATION RATE: 17 BRPM

## 2020-10-07 DIAGNOSIS — N39.0 URINARY TRACT INFECTION WITHOUT HEMATURIA, SITE UNSPECIFIED: Primary | ICD-10-CM

## 2020-10-07 LAB
APPEARANCE UR: ABNORMAL
BACTERIA URNS QL MICRO: 0 /HPF
BILIRUB UR QL: NEGATIVE
CASTS URNS QL MICRO: ABNORMAL /LPF
COLOR UR: ABNORMAL
EPI CELLS #/AREA URNS HPF: ABNORMAL /HPF
GLUCOSE UR STRIP.AUTO-MCNC: NEGATIVE MG/DL
HCG UR QL: NEGATIVE
HGB UR QL STRIP: NEGATIVE
KETONES UR QL STRIP.AUTO: NEGATIVE MG/DL
LEUKOCYTE ESTERASE UR QL STRIP.AUTO: ABNORMAL
NITRITE UR QL STRIP.AUTO: POSITIVE
PH UR STRIP: 6 [PH] (ref 5–9)
PROT UR STRIP-MCNC: 30 MG/DL
RBC #/AREA URNS HPF: ABNORMAL /HPF
SP GR UR REFRACTOMETRY: 1.02 (ref 1–1.02)
UROBILINOGEN UR QL STRIP.AUTO: 1 EU/DL (ref 0.2–1)
WBC URNS QL MICRO: >100 /HPF

## 2020-10-07 PROCEDURE — 99283 EMERGENCY DEPT VISIT LOW MDM: CPT

## 2020-10-07 PROCEDURE — 81025 URINE PREGNANCY TEST: CPT

## 2020-10-07 PROCEDURE — 81001 URINALYSIS AUTO W/SCOPE: CPT

## 2020-10-07 RX ORDER — CEPHALEXIN 500 MG/1
500 CAPSULE ORAL 3 TIMES DAILY
Qty: 21 CAP | Refills: 0 | Status: SHIPPED | OUTPATIENT
Start: 2020-10-07 | End: 2020-10-14

## 2020-10-07 RX ORDER — PHENAZOPYRIDINE HYDROCHLORIDE 95 MG/1
200 TABLET ORAL
Status: DISCONTINUED | OUTPATIENT
Start: 2020-10-07 | End: 2020-10-07

## 2020-10-07 RX ORDER — CEPHALEXIN 500 MG/1
500 CAPSULE ORAL
Status: DISCONTINUED | OUTPATIENT
Start: 2020-10-07 | End: 2020-10-07

## 2020-10-07 RX ORDER — PHENAZOPYRIDINE HYDROCHLORIDE 200 MG/1
200 TABLET, FILM COATED ORAL 3 TIMES DAILY
Qty: 6 TAB | Refills: 0 | Status: SHIPPED | OUTPATIENT
Start: 2020-10-07 | End: 2020-10-09

## 2020-10-07 NOTE — ED NOTES
I have reviewed discharge instructions with the patient. The patient verbalized understanding. Patient left ED via Discharge Method: ambulatory to Home with self. Opportunity for questions and clarification provided. Patient given 2 scripts. To continue your aftercare when you leave the hospital, you may receive an automated call from our care team to check in on how you are doing. This is a free service and part of our promise to provide the best care and service to meet your aftercare needs.  If you have questions, or wish to unsubscribe from this service please call [phone]. Thank you for Choosing our [facility] Emergency Department.

## 2020-10-07 NOTE — ED TRIAGE NOTES
Pt complains of pain and burning with urinary retention with urgency she states started tonight. Pt arrived with mask from home in place.

## 2020-10-07 NOTE — ED PROVIDER NOTES
[facility]     Nelida Verde is a 30 y.o. female seen on 10/7/2020 at 5:43 AM in the [facility] EMERGENCY DEPT in room FT11/11. CC: Dysuria    HPI:      The history is provided by the patient. Urinary Pain    This is a new problem. The current episode started 6 to 12 hours ago. The problem occurs every urination. The problem has been gradually worsening. The quality of the pain is described as burning and aching. The pain is moderate. There has been no fever. She is sexually active. There is no history of pyelonephritis. Associated symptoms include frequency and back pain. Pertinent negatives include no chills, no sweats, no nausea, no vomiting, no discharge, no hematuria, no hesitancy, no urgency, no flank pain, no vaginal discharge and no abdominal pain. The patient is not pregnant. She has tried nothing for the symptoms. The treatment provided no relief. Her past medical history is significant for recurrent UTIs (Last UTI about 1 year ago). Her past medical history does not include kidney stones, single kidney, urological procedure, urinary stasis, catheterization or urinary catheter problem. REVIEW OF SYSTEMS     Review of Systems   Constitutional: Negative for chills and fever. Gastrointestinal: Negative for abdominal pain, nausea and vomiting. Genitourinary: Positive for dysuria and frequency. Negative for flank pain, hematuria, hesitancy, urgency and vaginal discharge. Musculoskeletal: Positive for back pain. All other systems reviewed and are negative.       PAST MEDICAL HISTORY     Past Medical History:   Diagnosis Date    History of recurrent UTIs      Past Surgical History:   Procedure Laterality Date    HX HEENT       Social History     Socioeconomic History    Marital status:      Spouse name: Not on file    Number of children: Not on file    Years of education: Not on file    Highest education level: Not on file   Tobacco Use    Smoking status: Never Smoker    Smokeless tobacco: Never Used   Substance and Sexual Activity    Alcohol use: No     Frequency: Never    Drug use: No     None     Allergies   Allergen Reactions    Peanuts [Peanut Oil] Hives    Sulfa (Sulfonamide Antibiotics) Hives        PHYSICAL EXAM       Vitals:    10/07/20 0515 10/07/20 0516 10/07/20 0517   BP: (!) 121/56  (!) 121/56   Pulse:  87 82   Resp:   17   Temp:   98.1 °F (36.7 °C)   SpO2: 99% 97% 97%    Vital signs were reviewed. Physical Exam  Vitals signs and nursing note reviewed. Constitutional:       General: She is in acute distress (mild). Appearance: She is well-developed. HENT:      Head: Normocephalic and atraumatic. Right Ear: External ear normal.      Left Ear: External ear normal.      Mouth/Throat:      Mouth: Mucous membranes are moist.   Eyes:      Extraocular Movements: Extraocular movements intact. Conjunctiva/sclera: Conjunctivae normal.      Pupils: Pupils are equal, round, and reactive to light. Neck:      Musculoskeletal: Normal range of motion and neck supple. Abdominal:      Palpations: Abdomen is soft. Tenderness: There is no abdominal tenderness. Musculoskeletal: Normal range of motion. Skin:     General: Skin is warm and dry. Capillary Refill: Capillary refill takes less than 2 seconds. Neurological:      Mental Status: She is alert and oriented to person, place, and time.    Psychiatric:         Mood and Affect: Mood normal.         Behavior: Behavior normal.          MEDICAL DECISION MAKING     MDM  Number of Diagnoses or Management Options  Urinary tract infection without hematuria, site unspecified: new, needed workup     Amount and/or Complexity of Data Reviewed  Clinical lab tests: ordered and reviewed  Review and summarize past medical records: yes    Risk of Complications, Morbidity, and/or Mortality  Presenting problems: low  Diagnostic procedures: minimal  Management options: low    Patient Progress  Patient progress: stable    Procedures    ED Course:    Labs Reviewed   URINALYSIS W/ RFLX MICROSCOPIC - Abnormal; Notable for the following components:       Result Value    Protein 30 (*)     Nitrites Positive (*)     Leukocyte Esterase LARGE (*)     WBC >100 (*)     All other components within normal limits   HCG URINE, QL       Disposition: Discharge  Diagnosis: UTI  ____________________________________________________________________  A portion of this note was generated using voice recognition dictation software. While the note has been reviewed for accuracy, please note certain words and phrases may not be transcribed as intended and some grammatical and/or typographical errors may be present.

## 2020-10-07 NOTE — DISCHARGE INSTRUCTIONS

## 2020-10-29 ENCOUNTER — HOSPITAL ENCOUNTER (EMERGENCY)
Age: 25
Discharge: HOME | End: 2020-10-29
Attending: EMERGENCY MEDICINE
Payer: MEDICAID

## 2020-10-29 ENCOUNTER — APPOINTMENT (OUTPATIENT)
Dept: GENERAL RADIOLOGY | Age: 25
End: 2020-10-29
Attending: EMERGENCY MEDICINE
Payer: MEDICAID

## 2020-10-29 VITALS
WEIGHT: 150 LBS | HEIGHT: 63 IN | DIASTOLIC BLOOD PRESSURE: 66 MMHG | HEART RATE: 89 BPM | SYSTOLIC BLOOD PRESSURE: 121 MMHG | RESPIRATION RATE: 16 BRPM | OXYGEN SATURATION: 99 % | TEMPERATURE: 98.9 F | BODY MASS INDEX: 26.58 KG/M2

## 2020-10-29 DIAGNOSIS — J02.9 VIRAL PHARYNGITIS: Primary | ICD-10-CM

## 2020-10-29 LAB
DEPRECATED S PYO AG THROAT QL EIA: NEGATIVE
FLUAV AG NPH QL IA: NEGATIVE
FLUBV AG NPH QL IA: NEGATIVE
SPECIMEN SOURCE: NORMAL

## 2020-10-29 PROCEDURE — 96375 TX/PRO/DX INJ NEW DRUG ADDON: CPT

## 2020-10-29 PROCEDURE — 96374 THER/PROPH/DIAG INJ IV PUSH: CPT

## 2020-10-29 PROCEDURE — 87804 INFLUENZA ASSAY W/OPTIC: CPT

## 2020-10-29 PROCEDURE — 96372 THER/PROPH/DIAG INJ SC/IM: CPT

## 2020-10-29 PROCEDURE — 87081 CULTURE SCREEN ONLY: CPT

## 2020-10-29 PROCEDURE — 71046 X-RAY EXAM CHEST 2 VIEWS: CPT

## 2020-10-29 PROCEDURE — 74011250636 HC RX REV CODE- 250/636: Performed by: EMERGENCY MEDICINE

## 2020-10-29 PROCEDURE — 74011250637 HC RX REV CODE- 250/637: Performed by: EMERGENCY MEDICINE

## 2020-10-29 PROCEDURE — 87635 SARS-COV-2 COVID-19 AMP PRB: CPT

## 2020-10-29 PROCEDURE — 99283 EMERGENCY DEPT VISIT LOW MDM: CPT

## 2020-10-29 PROCEDURE — 87880 STREP A ASSAY W/OPTIC: CPT

## 2020-10-29 RX ORDER — ONDANSETRON 2 MG/ML
4 INJECTION INTRAMUSCULAR; INTRAVENOUS ONCE
Status: COMPLETED | OUTPATIENT
Start: 2020-10-29 | End: 2020-10-29

## 2020-10-29 RX ORDER — ONDANSETRON 4 MG/1
4 TABLET, ORALLY DISINTEGRATING ORAL
Qty: 12 TAB | Refills: 0 | Status: SHIPPED | OUTPATIENT
Start: 2020-10-29

## 2020-10-29 RX ORDER — DEXAMETHASONE SODIUM PHOSPHATE 100 MG/10ML
10 INJECTION INTRAMUSCULAR; INTRAVENOUS
Status: COMPLETED | OUTPATIENT
Start: 2020-10-29 | End: 2020-10-29

## 2020-10-29 RX ORDER — ACETAMINOPHEN 500 MG
1000 TABLET ORAL
Status: DISCONTINUED | OUTPATIENT
Start: 2020-10-29 | End: 2020-10-29

## 2020-10-29 RX ORDER — KETOROLAC TROMETHAMINE 30 MG/ML
60 INJECTION, SOLUTION INTRAMUSCULAR; INTRAVENOUS
Status: COMPLETED | OUTPATIENT
Start: 2020-10-29 | End: 2020-10-29

## 2020-10-29 RX ORDER — PREDNISONE 5 MG/1
TABLET ORAL
Qty: 21 TAB | Refills: 0 | Status: SHIPPED | OUTPATIENT
Start: 2020-10-29

## 2020-10-29 RX ADMIN — ONDANSETRON 4 MG: 2 INJECTION INTRAMUSCULAR; INTRAVENOUS at 19:02

## 2020-10-29 RX ADMIN — KETOROLAC TROMETHAMINE 60 MG: 30 INJECTION, SOLUTION INTRAMUSCULAR at 17:19

## 2020-10-29 RX ADMIN — ACETAMINOPHEN ORAL SOLUTION 650 MG: 325 SOLUTION ORAL at 15:24

## 2020-10-29 RX ADMIN — SODIUM CHLORIDE 1000 ML: 900 INJECTION, SOLUTION INTRAVENOUS at 19:02

## 2020-10-29 RX ADMIN — DEXAMETHASONE SODIUM PHOSPHATE 10 MG: 10 INJECTION INTRAMUSCULAR; INTRAVENOUS at 19:02

## 2020-10-29 NOTE — ED PROVIDER NOTES
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 She is been taking Tylenol and can get her fever below 100. Voice is hoarse. No specific medical history. The history is provided by the patient. Sore Throat    This is a new problem. The current episode started 2 days ago. The problem has not changed since onset. There has been a fever of 102 - 102.9 F. Associated symptoms include congestion, swollen glands and trouble swallowing. Pertinent negatives include no diarrhea, no vomiting, no drooling, no ear discharge, no ear pain, no headaches, no plugged ear sensation, no shortness of breath, no stridor, no stiff neck and no cough. She has tried acetaminophen and NSAIDs for the symptoms. The treatment provided no relief. Past Medical History:   Diagnosis Date    History of recurrent UTIs        Past Surgical History:   Procedure Laterality Date    HX HEENT           History reviewed. No pertinent family history.     Social History     Socioeconomic History    Marital status:      Spouse name: Not on file    Number of children: Not on file    Years of education: Not on file    Highest education level: Not on file   Occupational History    Not on file   Social Needs    Financial resource strain: Not on file    Food insecurity     Worry: Not on file     Inability: Not on file    Transportation needs     Medical: Not on file     Non-medical: Not on file   Tobacco Use    Smoking status: Never Smoker    Smokeless tobacco: Never Used   Substance and Sexual Activity    Alcohol use: No     Frequency: Never    Drug use: No    Sexual activity: Not on file   Lifestyle    Physical activity     Days per week: Not on file     Minutes per session: Not on file    Stress: Not on file   Relationships    Social connections     Talks on phone: Not on file     Gets together: Not on file     Attends [employer] service: Not on file     Active member of club or organization: Not on file     Attends meetings of clubs or organizations: Not on file     Relationship status: Not on file    Intimate partner violence     Fear of current or ex partner: Not on file     Emotionally abused: Not on file     Physically abused: Not on file     Forced sexual activity: Not on file   Other Topics Concern    Not on file   Social History Narrative    Not on file         ALLERGIES: Peanuts [peanut oil] and Sulfa (sulfonamide antibiotics)    Review of Systems   HENT: Positive for congestion, sore throat and trouble swallowing. Negative for drooling, ear discharge and ear pain. Respiratory: Negative for cough, shortness of breath and stridor. Gastrointestinal: Negative for diarrhea and vomiting. Neurological: Negative for headaches. All other systems reviewed and are negative. Vitals:    10/29/20 1517   BP: 110/63   Pulse: (!) 105   Resp: 16   Temp: (!) 101.1 °F (38.4 °C)   SpO2: 97%   Weight: 68 kg (150 lb)   Height: 5' 3\" (1.6 m)            Physical Exam  Vitals signs and nursing note reviewed. Constitutional:       Appearance: She is well-developed. HENT:      Head: Normocephalic and atraumatic. Comments: Erythema without exudate, no stridor, hoarse voice  Eyes:      Extraocular Movements: Extraocular movements intact. Conjunctiva/sclera: Conjunctivae normal.      Pupils: Pupils are equal, round, and reactive to light. Neck:      Musculoskeletal: Neck supple. Cardiovascular:      Rate and Rhythm: Regular rhythm. Tachycardia present. Pulmonary:      Effort: Pulmonary effort is normal. No tachypnea. Breath sounds: Normal breath sounds. Abdominal:      General: Bowel sounds are normal.      Palpations: Abdomen is soft. Musculoskeletal: Normal range of motion. Lymphadenopathy:      Cervical: Cervical adenopathy present. Skin:     General: Skin is warm and dry.    Neurological:      Mental Status: She is alert and oriented to person, place, and time. 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 Amount and/or Complexity of Data Reviewed  Clinical lab tests: ordered and reviewed (Results for orders placed or performed during the hospital encounter of 10/29/20  -STREP AG SCREEN, GROUP A  Specimen: Serum       Result                      Value             Ref Range           Group A Strep Ag ID         Negative          NEG            -INFLUENZA A & B AG (RAPID TEST)       Result                      Value             Ref Range           Influenza A Ag              Negative          NEG                 Influenza B Ag              Negative          NEG                 Source                                                        Nasopharyngeal  -CULTURE, STREP THROAT  Specimen: Throat       Result                      Value             Ref Range           Special Requests:                                             NO SPECIAL REQUESTS   Reflexed from [id number]          Culture result:                                               LIGHT STREPTOCOCCUS PYOGENES SERO GROUP A (A)  -SARS-COV-2       Result                      Value             Ref Range           Specimen source                                               Nasopharyngeal       COVID-19 rapid test         Not detected      NOTD                SARS CoV-2                  Negative          Negative       )    Risk of Complications, Morbidity, and/or Mortality  Presenting problems: high  Diagnostic procedures: moderate  Management options: moderate  General comments: Patient's vital signs have normalized. She feels better. Sending patient home with medications for symptom control. Advised patient that her rapid Covid swab was negative but that the PCR test may come back positive and she expressed understanding.     I personally reviewed the patient's vital signs, laboratory tests, and/or radiological findings. I discussed these findings with the patient and their significance. I answered all questions and gave the patient clear return precautions.   The patient was discharged from the emergency department in stable condition        Patient Progress  Patient progress: improved         Procedures

## 2020-10-29 NOTE — LETTER
[address] [address] EMERGENCY DEPT 
[address] 
[facility] [id number] [phone] Work/School Note Date: 10/29/2020 To Whom It May concern: 
 
Bobby Canales was seen and treated today in the emergency room by the following provider(s): 
Attending Provider: Anjana Castaneda MD. Bobby Canales may return to work on 11/2/20 or until Covid-19 swab comes back negative. Sincerely, Tessie Martin RN

## 2020-10-29 NOTE — ED TRIAGE NOTES
Pt reports fever, chills, shortness of breath and body aches since Tuesday. Pt wearing mask in triage. Pt reports swelling to bilateral sides of neck.     Huy Abernathy RN

## 2020-10-29 NOTE — DISCHARGE INSTRUCTIONS
Your rapid Covid swab, flu swab, and strep throat swabs were all negative. There is still a 10 to 15% chance that your Covid swab PCR will come back positive. Continue to manage her self as if you have it. Use the steroid and nausea medication as directed. Continue Tylenol and Motrin as needed for fever control.

## 2020-10-30 ENCOUNTER — PATIENT OUTREACH (OUTPATIENT)
Dept: CASE MANAGEMENT | Age: 25
End: 2020-10-30

## 2020-10-30 LAB
COVID-19 RAPID TEST, COVR: NOT DETECTED
SARS COV-2, XPGCVT: NEGATIVE
SOURCE, COVRS: NORMAL

## 2020-10-30 NOTE — PROGRESS NOTES
Transitions of Care outreach in follow up to ED visit 10/29/2020  Presented with fever, hoarsness, congestion, swollen glands (neck), trouble swallowing  COVID test results pending    Unable to reach  [facility] Number

## 2020-11-01 LAB
BACTERIA SPEC CULT: ABNORMAL
SERVICE CMNT-IMP: ABNORMAL

## 2021-10-26 ENCOUNTER — HOSPITAL ENCOUNTER (EMERGENCY)
Age: 26
Discharge: HOME | End: 2021-10-27
Attending: EMERGENCY MEDICINE
Payer: MEDICAID

## 2021-10-26 VITALS
OXYGEN SATURATION: 97 % | BODY MASS INDEX: 28 KG/M2 | HEART RATE: 77 BPM | DIASTOLIC BLOOD PRESSURE: 82 MMHG | RESPIRATION RATE: 18 BRPM | TEMPERATURE: 98.5 F | HEIGHT: 63 IN | SYSTOLIC BLOOD PRESSURE: 117 MMHG | WEIGHT: 158 LBS

## 2021-10-26 DIAGNOSIS — B37.9 CANDIDIASIS: ICD-10-CM

## 2021-10-26 DIAGNOSIS — N30.90 CYSTITIS: Primary | ICD-10-CM

## 2021-10-26 LAB
AMORPH CRY URNS QL MICRO: ABNORMAL
BACTERIA URNS QL MICRO: 0 /HPF
CASTS URNS QL MICRO: 0 /LPF
CRYSTALS URNS QL MICRO: 0 /LPF
EPI CELLS #/AREA URNS HPF: ABNORMAL /HPF
HCG UR QL: NEGATIVE
MUCOUS THREADS URNS QL MICRO: 0 /LPF
OTHER OBSERVATIONS,UCOM: ABNORMAL
RBC #/AREA URNS HPF: ABNORMAL /HPF
WBC URNS QL MICRO: ABNORMAL /HPF
YEAST URNS QL MICRO: ABNORMAL

## 2021-10-26 PROCEDURE — 87591 N.GONORRHOEAE DNA AMP PROB: CPT

## 2021-10-26 PROCEDURE — 81025 URINE PREGNANCY TEST: CPT

## 2021-10-26 PROCEDURE — 96372 THER/PROPH/DIAG INJ SC/IM: CPT

## 2021-10-26 PROCEDURE — 81003 URINALYSIS AUTO W/O SCOPE: CPT

## 2021-10-26 PROCEDURE — 99283 EMERGENCY DEPT VISIT LOW MDM: CPT

## 2021-10-26 PROCEDURE — 81015 MICROSCOPIC EXAM OF URINE: CPT

## 2021-10-26 RX ORDER — NITROFURANTOIN 25; 75 MG/1; MG/1
100 CAPSULE ORAL 2 TIMES DAILY
Qty: 20 CAPSULE | Refills: 0 | Status: SHIPPED | OUTPATIENT
Start: 2021-10-26 | End: 2021-11-05

## 2021-10-26 NOTE — LETTER
[id number] [address] EMERGENCY DEPT  [address] [facility] [id number] [phone]    Work/School Note    Date: 10/26/2021    To Whom It May concern:    Lindy Chisholm was seen and treated today in the emergency room by the following provider(s):  Attending Provider: Leonie Fang MD  Physician Assistant: KODAK Haas. Lindy Chisholm is excused from work/school on 10/27/21 and 10/28/21. She is medically clear to return to work/school on 10/29/2021.        Sincerely,          KODAK Olguin

## 2021-10-27 PROCEDURE — 96372 THER/PROPH/DIAG INJ SC/IM: CPT

## 2021-10-27 PROCEDURE — 74011250636 HC RX REV CODE- 250/636: Performed by: PHYSICIAN ASSISTANT

## 2021-10-27 PROCEDURE — 74011000250 HC RX REV CODE- 250: Performed by: PHYSICIAN ASSISTANT

## 2021-10-27 RX ORDER — FLUCONAZOLE 150 MG/1
150 TABLET ORAL
Qty: 3 TABLET | Refills: 0 | Status: SHIPPED | OUTPATIENT
Start: 2021-10-27 | End: 2021-11-11

## 2021-10-27 RX ORDER — DOXYCYCLINE HYCLATE 100 MG
100 TABLET ORAL 2 TIMES DAILY
Qty: 20 TABLET | Refills: 0 | Status: SHIPPED | OUTPATIENT
Start: 2021-10-27 | End: 2021-11-06

## 2021-10-27 RX ADMIN — LIDOCAINE HYDROCHLORIDE 500 MG: 10 INJECTION, SOLUTION INFILTRATION; PERINEURAL at 00:11

## 2021-10-27 NOTE — ED TRIAGE NOTES
Pt states that she's been having increased urinary urgency and some pain during urination over the past 5 days. Pt states that she's been taking pyridium at home but that has not helped.   Pt states this feels similar to UTIs that she's had in the past.

## 2021-10-27 NOTE — ED PROVIDER NOTES
Patient is here with polyuria since Thursday, 5 days ago. She is 1 day late for her menstrual cycle. Her last one was September 25-30. She has not had a fever, vaginal discharge, back pain, chest pain, shortness of breath, nausea, vomiting or other new symptoms. She did ambulate to the room without difficulty and is well-hydrated. She is sexually active and not on birth control. The history is provided by the patient. Past Medical History:   Diagnosis Date    History of recurrent UTIs        Past Surgical History:   Procedure Laterality Date    HX HEENT           History reviewed. No pertinent family history. Social History     Socioeconomic History    Marital status:      Spouse name: Not on file    Number of children: Not on file    Years of education: Not on file    Highest education level: Not on file   Occupational History    Not on file   Tobacco Use    Smoking status: Never Smoker    Smokeless tobacco: Never Used   Substance and Sexual Activity    Alcohol use: No    Drug use: No    Sexual activity: Not on file   Other Topics Concern    Not on file   Social History Narrative    Not on file     Social Determinants of Health     Financial Resource Strain:     Difficulty of Paying Living Expenses:    Food Insecurity:     Worried About Running Out of Food in the Last Year:     [address] in the Last Year:    Transportation Needs:     Lack of Transportation (Medical):      Lack of Transportation (Non-Medical):    Physical Activity:     Days of Exercise per Week:     Minutes of Exercise per Session:    Stress:     Feeling of Stress :    Social Connections:     Frequency of Communication with Friends and Family:     Frequency of Social Gatherings with Friends and Family:     Attends Faith Services:     Active Member of Clubs or Organizations:     Attends Club or Organization Meetings:     Marital Status:    Intimate Partner Violence:     Fear of Current or Ex-Partner:     Emotionally Abused:     Physically Abused:     Sexually Abused: ALLERGIES: Peanuts [peanut oil] and Sulfa (sulfonamide antibiotics)    Review of Systems   Constitutional: Negative. HENT: Negative. Eyes: Negative. Respiratory: Negative. Cardiovascular: Negative. Gastrointestinal: Negative. Genitourinary: Positive for urgency. Musculoskeletal: Negative. Skin: Negative. Neurological: Negative. Psychiatric/Behavioral: Negative. All other systems reviewed and are negative. Vitals:    10/26/21 2225   BP: 117/82   Pulse: 77   Resp: 18   Temp: 98.5 °F (36.9 °C)   SpO2: 97%   Weight: 71.7 kg (158 lb)   Height: 5' 3\" (1.6 m)            Physical Exam  Vitals and nursing note reviewed. Constitutional:       Appearance: She is well-developed. HENT:      Head: Normocephalic and atraumatic. Right Ear: External ear normal.      Left Ear: External ear normal.      Nose: Nose normal.      Mouth/Throat:      Mouth: Mucous membranes are moist.   Eyes:      Conjunctiva/sclera: Conjunctivae normal.      Pupils: Pupils are equal, round, and reactive to light. Cardiovascular:      Rate and Rhythm: Normal rate. Pulses: Normal pulses. Pulmonary:      Effort: Pulmonary effort is normal.   Abdominal:      General: Abdomen is flat. Musculoskeletal:         General: Normal range of motion. Cervical back: Normal range of motion and neck supple. Skin:     General: Skin is warm and dry. Capillary Refill: Capillary refill takes less than 2 seconds. Neurological:      General: No focal deficit present. Mental Status: She is alert and oriented to person, place, and time. Deep Tendon Reflexes: Reflexes are normal and symmetric. Psychiatric:         Mood and Affect: Mood normal.         Behavior: Behavior normal.         Thought Content:  Thought content normal.         Judgment: Judgment normal.          MDM  Number of Diagnoses or Management Options     Amount and/or Complexity of Data Reviewed  Clinical lab tests: ordered    Risk of Complications, Morbidity, and/or Mortality  Presenting problems: moderate  Diagnostic procedures: moderate  Management options: moderate           Procedures        The patient was observed in the ED. Results Reviewed:      Recent Results (from the past 24 hour(s))   HCG URINE, QL. - POC    Collection Time: 10/26/21 11:23 PM   Result Value Ref Range    Pregnancy test,urine (POC) Negative NEG     URINE MICROSCOPIC    Collection Time: 10/26/21 11:24 PM   Result Value Ref Range    WBC 20-50 0 /hpf    RBC 3-5 0 /hpf    Epithelial cells 0-3 0 /hpf    Bacteria 0 0 /hpf    Casts 0 0 /lpf    Crystals, urine 0 0 /LPF    Amorphous Crystals 2+ (H) 0    Mucus 0 0 /lpf    Yeast MODERATE      Other observations RESULTS VERIFIED MANUALLY     Patient had +nitrates in urine with 50 WBC but no bacteria. I will treat prophylactically. I have also sent off for gonorrhea and chlamydia. I have prophylactically treated here for Gonorrhea, will send home with Doxycycline for Chlamydia and will have partner checked and treated as well. No intercourse for at least ten days. Follow up with the health department for further STD testing. Return to the ED if worse. Drink plenty of fluids and rest. Patient is stable for discharge and ambulatory out of the ED without difficulty at this time. I discussed the results of all labs, procedures, radiographs, and treatments with the patient and available family. Treatment plan is agreed upon and the patient is ready for discharge. All voiced understanding of the discharge plan and medication instructions or changes as appropriate. Questions about treatment in the ED were answered. All were encouraged to return should symptoms worsen or new problems develop.

## 2021-10-27 NOTE — DISCHARGE INSTRUCTIONS
Drink plenty of fluids, finish all of the antibiotics, return to the ED if worse. No intercourse until cultures have returned.

## 2021-10-27 NOTE — ED NOTES
I have reviewed discharge instructions with the patient. The patient verbalized understanding. Patient left ED via Discharge Method: ambulatory to Home by self. Opportunity for questions and clarification provided. Patient given 3 scripts. To continue your aftercare when you leave the hospital, you may receive an automated call from our care team to check in on how you are doing. This is a free service and part of our promise to provide the best care and service to meet your aftercare needs.  If you have questions, or wish to unsubscribe from this service please call [phone]. Thank you for Choosing our [facility] Emergency Department.

## 2021-11-02 LAB
C TRACH RRNA SPEC QL NAA+PROBE: POSITIVE
N GONORRHOEA RRNA SPEC QL NAA+PROBE: NEGATIVE
SPECIMEN SOURCE: ABNORMAL

## 2021-11-03 NOTE — PROGRESS NOTES
ED pharmacist has attempted to contact Dipesh Chávez on 11/03/21 regarding the recent results of their STI culture via the phone number on file. The patient has been appropriately treated with empiric antibiotics in the emergency department prior to discharge. The patient received a prescription for doxycycline 100mg PO BID x 7 days upon discharge. Based on culture results, the patient is being adequately treated for the identified infection with existing antimicrobial therapy. Will continue to attempt contact to inform patient of culture results and provide appropriate education.     Allergies as of 10/26/2021 - Fully Reviewed 10/26/2021   Allergen Reaction Noted    Peanuts [peanut oil] Hives 10/02/2019    Sulfa (sulfonamide antibiotics) Hives 12/24/2018      Art Abrams PharmD candidate 2022    Rea Chu, PharmD  Emergency Medicine Clinical Pharmacist

## 2021-11-04 NOTE — PROGRESS NOTES
ED pharmacist has attempted to contact Dipesh Chávez on 11/03/2021 and 11/04/2021 regarding their recent culture results via the phone number on file. See previous progress note. The patient has not returned calls. A letter has been sent to the address on file instructing patient to return call to ED.     Artur Meyer PharmD candidate 2022    Yovanny Araya PharmD  Emergency Medicine Clinical Pharmacist

## 2022-12-20 ENCOUNTER — APPOINTMENT (OUTPATIENT)
Dept: CT IMAGING | Age: 27
End: 2022-12-20

## 2022-12-20 ENCOUNTER — HOSPITAL ENCOUNTER (EMERGENCY)
Age: 27
Discharge: HOME | End: 2022-12-20
Attending: EMERGENCY MEDICINE | Admitting: EMERGENCY MEDICINE

## 2022-12-20 VITALS
RESPIRATION RATE: 16 BRPM | DIASTOLIC BLOOD PRESSURE: 80 MMHG | OXYGEN SATURATION: 99 % | BODY MASS INDEX: 26.58 KG/M2 | HEART RATE: 86 BPM | TEMPERATURE: 98.3 F | SYSTOLIC BLOOD PRESSURE: 121 MMHG | HEIGHT: 63 IN | WEIGHT: 150 LBS

## 2022-12-20 DIAGNOSIS — N83.209 RUPTURED OVARIAN CYST: Primary | ICD-10-CM

## 2022-12-20 LAB
ANION GAP SERPL CALC-SCNC: 12 MMOL/L (ref 2–11)
APPEARANCE UR: ABNORMAL
BACTERIA URNS QL MICRO: NORMAL /HPF
BASOPHILS # BLD: 0.1 K/UL (ref 0–0.2)
BASOPHILS NFR BLD: 0 % (ref 0–2)
BILIRUB UR QL: NEGATIVE
BUN SERPL-MCNC: 11 MG/DL (ref 7–18)
CALCIUM SERPL-MCNC: 9.3 MG/DL (ref 8.3–10.4)
CASTS URNS QL MICRO: 0 /LPF
CHLORIDE SERPL-SCNC: 107 MMOL/L (ref 98–107)
CO2 SERPL-SCNC: 19 MMOL/L (ref 21–32)
COLOR UR: YELLOW
CREAT SERPL-MCNC: 0.58 MG/DL (ref 0.6–1)
CRP SERPL-MCNC: 0.3 MG/DL (ref 0–0.9)
CRYSTALS URNS QL MICRO: 0 /LPF
DIFFERENTIAL METHOD BLD: ABNORMAL
EOSINOPHIL # BLD: 0 K/UL (ref 0–0.8)
EOSINOPHIL NFR BLD: 0 % (ref 0.5–7.8)
EPI CELLS #/AREA URNS HPF: NORMAL /HPF
ERYTHROCYTE [DISTWIDTH] IN BLOOD BY AUTOMATED COUNT: 13.5 % (ref 11.9–14.6)
GLUCOSE SERPL-MCNC: 119 MG/DL (ref 65–100)
GLUCOSE UR STRIP.AUTO-MCNC: NEGATIVE MG/DL
HCG UR QL: NEGATIVE
HCT VFR BLD AUTO: 36.8 % (ref 35.8–46.3)
HGB BLD-MCNC: 12 G/DL (ref 11.7–15.4)
HGB UR QL STRIP: NEGATIVE
IMM GRANULOCYTES # BLD AUTO: 0.1 K/UL (ref 0–0.5)
IMM GRANULOCYTES NFR BLD AUTO: 0 % (ref 0–5)
KETONES UR QL STRIP.AUTO: 15 MG/DL
LEUKOCYTE ESTERASE UR QL STRIP.AUTO: NEGATIVE
LYMPHOCYTES # BLD: 1.3 K/UL (ref 0.5–4.6)
LYMPHOCYTES NFR BLD: 10 % (ref 13–44)
MCH RBC QN AUTO: 29.6 PG (ref 26.1–32.9)
MCHC RBC AUTO-ENTMCNC: 32.6 G/DL (ref 31.4–35)
MCV RBC AUTO: 90.9 FL (ref 82–102)
MONOCYTES # BLD: 0.6 K/UL (ref 0.1–1.3)
MONOCYTES NFR BLD: 4 % (ref 4–12)
MUCOUS THREADS URNS QL MICRO: 0 /LPF
NEUTS SEG # BLD: 11.6 K/UL (ref 1.7–8.2)
NEUTS SEG NFR BLD: 85 % (ref 43–78)
NITRITE UR QL STRIP.AUTO: NEGATIVE
NRBC # BLD: 0 K/UL (ref 0–0.2)
OTHER OBSERVATIONS: NORMAL
PH UR STRIP: 7 [PH] (ref 5–9)
PLATELET # BLD AUTO: 242 K/UL (ref 150–450)
PMV BLD AUTO: 10.5 FL (ref 9.4–12.3)
POTASSIUM SERPL-SCNC: 4.3 MMOL/L (ref 3.5–5.1)
PROT UR STRIP-MCNC: ABNORMAL MG/DL
RBC # BLD AUTO: 4.05 M/UL (ref 4.05–5.2)
RBC #/AREA URNS HPF: NORMAL /HPF
SODIUM SERPL-SCNC: 138 MMOL/L (ref 133–143)
SP GR UR REFRACTOMETRY: 1.02 (ref 1–1.02)
UROBILINOGEN UR QL STRIP.AUTO: 0.2 EU/DL (ref 0.2–1)
WBC # BLD AUTO: 13.6 K/UL (ref 4.3–11.1)
WBC URNS QL MICRO: NORMAL /HPF

## 2022-12-20 PROCEDURE — 96365 THER/PROPH/DIAG IV INF INIT: CPT

## 2022-12-20 PROCEDURE — 2580000003 HC RX 258: Performed by: EMERGENCY MEDICINE

## 2022-12-20 PROCEDURE — 6360000004 HC RX CONTRAST MEDICATION: Performed by: EMERGENCY MEDICINE

## 2022-12-20 PROCEDURE — 6370000000 HC RX 637 (ALT 250 FOR IP): Performed by: EMERGENCY MEDICINE

## 2022-12-20 PROCEDURE — 99285 EMERGENCY DEPT VISIT HI MDM: CPT

## 2022-12-20 PROCEDURE — 6360000002 HC RX W HCPCS: Performed by: EMERGENCY MEDICINE

## 2022-12-20 PROCEDURE — 96376 TX/PRO/DX INJ SAME DRUG ADON: CPT

## 2022-12-20 PROCEDURE — 80048 BASIC METABOLIC PNL TOTAL CA: CPT

## 2022-12-20 PROCEDURE — 81025 URINE PREGNANCY TEST: CPT

## 2022-12-20 PROCEDURE — 74177 CT ABD & PELVIS W/CONTRAST: CPT

## 2022-12-20 PROCEDURE — 86140 C-REACTIVE PROTEIN: CPT

## 2022-12-20 PROCEDURE — 96375 TX/PRO/DX INJ NEW DRUG ADDON: CPT

## 2022-12-20 PROCEDURE — 81001 URINALYSIS AUTO W/SCOPE: CPT

## 2022-12-20 PROCEDURE — 85025 COMPLETE CBC W/AUTO DIFF WBC: CPT

## 2022-12-20 RX ORDER — KETOROLAC TROMETHAMINE 30 MG/ML
30 INJECTION, SOLUTION INTRAMUSCULAR; INTRAVENOUS
Status: COMPLETED | OUTPATIENT
Start: 2022-12-20 | End: 2022-12-20

## 2022-12-20 RX ORDER — ONDANSETRON 2 MG/ML
4 INJECTION INTRAMUSCULAR; INTRAVENOUS
Status: COMPLETED | OUTPATIENT
Start: 2022-12-20 | End: 2022-12-20

## 2022-12-20 RX ORDER — SODIUM CHLORIDE 0.9 % (FLUSH) 0.9 %
10 SYRINGE (ML) INJECTION
Status: DISCONTINUED | OUTPATIENT
Start: 2022-12-20 | End: 2022-12-20 | Stop reason: HOSPADM

## 2022-12-20 RX ORDER — HYDROCODONE BITARTRATE AND ACETAMINOPHEN 5; 325 MG/1; MG/1
1 TABLET ORAL EVERY 4 HOURS PRN
Qty: 18 TABLET | Refills: 0 | Status: SHIPPED | OUTPATIENT
Start: 2022-12-20 | End: 2022-12-23

## 2022-12-20 RX ORDER — MORPHINE SULFATE 4 MG/ML
4 INJECTION INTRAVENOUS ONCE
Status: COMPLETED | OUTPATIENT
Start: 2022-12-20 | End: 2022-12-20

## 2022-12-20 RX ORDER — LIDOCAINE HYDROCHLORIDE 20 MG/ML
JELLY TOPICAL
Status: COMPLETED | OUTPATIENT
Start: 2022-12-20 | End: 2022-12-20

## 2022-12-20 RX ORDER — ONDANSETRON HYDROCHLORIDE 8 MG/1
8 TABLET, FILM COATED ORAL EVERY 8 HOURS PRN
Qty: 15 TABLET | Refills: 0 | Status: SHIPPED | OUTPATIENT
Start: 2022-12-20 | End: 2022-12-25

## 2022-12-20 RX ORDER — 0.9 % SODIUM CHLORIDE 0.9 %
100 INTRAVENOUS SOLUTION INTRAVENOUS
Status: COMPLETED | OUTPATIENT
Start: 2022-12-20 | End: 2022-12-20

## 2022-12-20 RX ADMIN — SODIUM CHLORIDE 100 ML: 9 INJECTION, SOLUTION INTRAVENOUS at 02:46

## 2022-12-20 RX ADMIN — MORPHINE SULFATE 4 MG: 4 INJECTION, SOLUTION INTRAMUSCULAR; INTRAVENOUS at 03:40

## 2022-12-20 RX ADMIN — LIDOCAINE HYDROCHLORIDE: 20 JELLY TOPICAL at 01:43

## 2022-12-20 RX ADMIN — PIPERACILLIN AND TAZOBACTAM 3375 MG: 3; .375 INJECTION, POWDER, FOR SOLUTION INTRAVENOUS at 03:41

## 2022-12-20 RX ADMIN — ONDANSETRON 4 MG: 2 INJECTION INTRAMUSCULAR; INTRAVENOUS at 01:41

## 2022-12-20 RX ADMIN — KETOROLAC TROMETHAMINE 30 MG: 30 INJECTION, SOLUTION INTRAMUSCULAR; INTRAVENOUS at 01:41

## 2022-12-20 RX ADMIN — IOPAMIDOL 100 ML: 755 INJECTION, SOLUTION INTRAVENOUS at 02:45

## 2022-12-20 RX ADMIN — ONDANSETRON 4 MG: 2 INJECTION INTRAMUSCULAR; INTRAVENOUS at 04:23

## 2022-12-20 ASSESSMENT — PAIN - FUNCTIONAL ASSESSMENT
PAIN_FUNCTIONAL_ASSESSMENT: 0-10
PAIN_FUNCTIONAL_ASSESSMENT: 0-10

## 2022-12-20 ASSESSMENT — PAIN SCALES - GENERAL
PAINLEVEL_OUTOF10: 10
PAINLEVEL_OUTOF10: 10
PAINLEVEL_OUTOF10: 6
PAINLEVEL_OUTOF10: 5
PAINLEVEL_OUTOF10: 9

## 2022-12-20 ASSESSMENT — ENCOUNTER SYMPTOMS
CONSTIPATION: 0
RECTAL PAIN: 1
NAUSEA: 1
ABDOMINAL PAIN: 1
DIARRHEA: 0
VOMITING: 1

## 2022-12-20 ASSESSMENT — PAIN DESCRIPTION - LOCATION
LOCATION: RECTUM

## 2022-12-20 ASSESSMENT — PAIN DESCRIPTION - DESCRIPTORS: DESCRIPTORS: ACHING

## 2022-12-20 NOTE — ED PROVIDER NOTES
Emergency Department Provider Note                   PCP:                None None               Age: 32 y.o. Sex: female       ICD-10-CM    1. Ruptured ovarian cyst  N83.209 HYDROcodone-acetaminophen (NORCO) 5-325 MG per tablet          DISPOSITION Decision To Discharge 12/20/2022 04:30:37 AM        MDM  Number of Diagnoses or Management Options  Ruptured ovarian cyst  Diagnosis management comments: Suspect proctitis, however differential could include perirectal abscess or other intrapelvic process. Amount and/or Complexity of Data Reviewed  Clinical lab tests: ordered and reviewed  Tests in the radiology section of CPT®: ordered and reviewed  Review and summarize past medical records: yes  Independent visualization of images, tracings, or specimens: yes    Risk of Complications, Morbidity, and/or Mortality  Presenting problems: moderate  Diagnostic procedures: moderate  Management options: moderate  General comments: CT findings significant for probable ruptured ovarian cyst as there is a cystic lesion in the left adnexa. The patient is hemodynamically stable and pain is under control. She is in the process of moving to [place] and will be leaving this week to return there. She states she has a gynecologist in [place] with an appointment scheduled. She was instructed to call them this morning for further instructions and to return if her symptoms worsen.     Patient Progress  Patient progress: stable                              Orders Placed This Encounter   Procedures    CT ABDOMEN PELVIS W IV CONTRAST Additional Contrast? Rectal    Urinalysis w rflx microscopic    Pregnancy, Urine    CBC with Auto Differential    Basic Metabolic Panel    C-Reactive Protein    Urinalysis, Micro        Medications   sodium chloride flush 0.9 % injection 10 mL (has no administration in time range)   piperacillin-tazobactam (ZOSYN) 3,375 mg in sodium chloride 0.9 % 50 mL IVPB (mini-bag) (3,375 mg IntraVENous New Bag 12/20/22 0341)   ketorolac (TORADOL) injection 30 mg (30 mg IntraVENous Given 12/20/22 0141)   lidocaine (XYLOCAINE) 2 % uro-jet ( Topical Given 12/20/22 0143)   ondansetron (ZOFRAN) injection 4 mg (4 mg IntraVENous Given 12/20/22 0141)   0.9 % sodium chloride bolus (100 mLs IntraVENous New Bag 12/20/22 0246)   iopamidol (ISOVUE-370) 76 % injection 100 mL (100 mLs IntraVENous Given 12/20/22 0245)   morphine injection 4 mg (4 mg IntraVENous Given 12/20/22 0340)   ondansetron (ZOFRAN) injection 4 mg (4 mg IntraVENous Given 12/20/22 0423)       New Prescriptions    HYDROCODONE-ACETAMINOPHEN (NORCO) 5-325 MG PER TABLET    Take 1 tablet by mouth every 4 hours as needed for Pain for up to 3 days. Intended supply: 3 days. Take lowest dose possible to manage pain    ONDANSETRON (ZOFRAN) 8 MG TABLET    Take 1 tablet by mouth every 8 hours as needed for Nausea or Vomiting        Mann Lezama is a 32 y.o. female who presents to the Emergency Department with chief complaint of    Chief Complaint   Patient presents with    Rectal Pain      Patient presents emerged from complaining of severe rectal pain. She states onset of symptoms was approximately 4 hours ago and came on suddenly. She reports a history of appendicitis and appendectomy about a month ago with subsequent abscess formation and drainage. She states that she was back to having normal mild bowel movements about a week afterwards and had a normal bowel movement this morning. However about 9 PM she began having severe rectal pain and went to the bathroom to try to have a bowel movement. She was straining several times and states that she \"passed out\" twice due to the straining and the pain. She was unable to pass any stool or flatus. She denies any history of hemorrhoids. She did become nauseated and vomited twice. She denies any fever or chills. The pain is radiating into the suprapubic abdominal area.   She denies any vaginal discharge or bleeding, stating her menses are regular. She is not sexually active. And she denies dysuria or hematuria. The history is provided by the patient and medical records. Review of Systems   Constitutional:  Negative for chills and fever. Gastrointestinal:  Positive for abdominal pain, nausea, rectal pain and vomiting. Negative for constipation and diarrhea. Genitourinary:  Negative for hematuria, vaginal discharge and vaginal pain. All other systems reviewed and are negative. Past Medical History:   Diagnosis Date    History of recurrent UTIs         Past Surgical History:   Procedure Laterality Date    HEENT          No family history on file. Social History     Socioeconomic History    Marital status:    Tobacco Use    Smoking status: Never    Smokeless tobacco: Never   Substance and Sexual Activity    Alcohol use: No    Drug use: No         Peanut oil and Sulfa antibiotics     Previous Medications    ONDANSETRON (ZOFRAN-ODT) 4 MG DISINTEGRATING TABLET    Take 4 mg by mouth every 8 hours as needed    PREDNISONE (DELTASONE) 5 MG TABLET    See administration instruction per 5mg dose pack        Vitals signs and nursing note reviewed. Patient Vitals for the past 4 hrs:   Temp Pulse Resp BP SpO2   12/20/22 0100 97.7 °F (36.5 °C) (!) 101 22 125/84 99 %          Physical Exam  Vitals and nursing note reviewed. Constitutional:       Appearance: Normal appearance. HENT:      Head: Normocephalic and atraumatic. Mouth/Throat:      Mouth: Mucous membranes are moist.      Pharynx: Oropharynx is clear. No oropharyngeal exudate or posterior oropharyngeal erythema. Eyes:      Extraocular Movements: Extraocular movements intact. Conjunctiva/sclera: Conjunctivae normal.      Pupils: Pupils are equal, round, and reactive to light. Cardiovascular:      Rate and Rhythm: Normal rate.    Pulmonary:      Effort: Pulmonary effort is normal.   Abdominal:      General: Bowel sounds are normal. There is no distension. Palpations: Abdomen is soft. Tenderness: There is abdominal tenderness. There is guarding. There is no right CVA tenderness or left CVA tenderness. Comments: Tenderness with guarding but no rigidity noted in the suprapubic area. Genitourinary:     Comments: External examination demonstrates no thrombosed hemorrhoids or hemorrhoids in general.  Digital rectal exam was attempted however there was significant edema in the rectum and further attempts were not performed due to tenderness to the patient. No prolapsed rectum. Skin:     General: Skin is warm and dry. Capillary Refill: Capillary refill takes less than 2 seconds. Neurological:      General: No focal deficit present. Mental Status: She is alert and oriented to person, place, and time. Mental status is at baseline. Psychiatric:         Mood and Affect: Mood normal.         Behavior: Behavior normal.         Thought Content: Thought content normal.        Procedures    Results for orders placed or performed during the hospital encounter of 12/20/22   CT ABDOMEN PELVIS W IV CONTRAST Additional Contrast? Rectal    Narrative    CT abdomen and pelvis with contrast     COMPARISON: None. INDICATION: Abdominal pain, suspected proctitis or perirectal abscess. TECHNIQUE: CT imaging was performed of the abdomen and pelvis following the  uncomplicated administration of intravenous contrast (Isovue 370, 100 mL). Oral  contrast was administered. Radiation dose reduction techniques were used for  this study:  Our CT scanners use one or all of the following: Automated exposure  control, adjustment of the mA and/or kVp according to patient's size, iterative  reconstruction. FINDINGS:    Visualized lung bases are unremarkable. Abdomen findings:    Gallbladder is surgically absent. The liver, pancreas, spleen, adrenal glands,  kidneys, and abdominal aorta are unremarkable.  There is no evidence of  lymphadenopathy. Stomach is normal in contour. Small bowel loops are normal in caliber. No small  bowel obstruction. Pelvic findings: There is a 2 cm cystic lesion in the left adnexa. There is moderate hemorrhagic  fluid in the pelvis. The uterus is not well evaluated. There is small amount of air within the anterior aspect of the urinary bladder,  nonspecific but may be related to recent instrumentation. No urinary bladder  wall thickening. The colon is normal in course and caliber with moderate stool volume. Appendix  is absent. There is no free air. Surrounding bones are intact. Impression    1. Small 2 cm cystic lesion in the left adnexa with associated surrounding  moderate hemorrhagic fluid in the pelvis. Findings suspicious for ruptured  hemorrhagic ovarian cyst.    2. No renal calculus or hydronephrosis. 4. No evidence of colitis, diverticulitis, or bowel obstruction. Specifically,  no evidence of proctitis or perirectal abscess.    Urinalysis w rflx microscopic   Result Value Ref Range    Color, UA YELLOW      Appearance SLIGHTLY CLOUDY      Specific Gravity, UA 1.025 (H) 1.001 - 1.023      pH, Urine 7.0 5.0 - 9.0      Protein, UA TRACE (A) NEG mg/dL    Glucose, UA Negative mg/dL    Ketones, Urine 15 (A) NEG mg/dL    Bilirubin Urine Negative NEG      Blood, Urine Negative NEG      Urobilinogen, Urine 0.2 0.2 - 1.0 EU/dL    Nitrite, Urine Negative NEG      Leukocyte Esterase, Urine Negative NEG     Pregnancy, Urine   Result Value Ref Range    HCG(Urine) Pregnancy Test Negative     CBC with Auto Differential   Result Value Ref Range    WBC 13.6 (H) 4.3 - 11.1 K/uL    RBC 4.05 4.05 - 5.20 M/uL    Hemoglobin 12.0 11.7 - 15.4 g/dL    Hematocrit 36.8 35.8 - 46.3 %    MCV 90.9 82.0 - 102.0 FL    MCH 29.6 26.1 - 32.9 PG    MCHC 32.6 31.4 - 35.0 g/dL    RDW 13.5 11.9 - 14.6 %    Platelets 918 824 - 517 K/uL    MPV 10.5 9.4 - 12.3 FL    nRBC 0.00 0.0 - 0.2 K/uL    Differential Type AUTOMATED      Seg Neutrophils 85 (H) 43 - 78 %    Lymphocytes 10 (L) 13 - 44 %    Monocytes 4 4.0 - 12.0 %    Eosinophils % 0 (L) 0.5 - 7.8 %    Basophils 0 0.0 - 2.0 %    Immature Granulocytes 0 0.0 - 5.0 %    Segs Absolute 11.6 (H) 1.7 - 8.2 K/UL    Absolute Lymph # 1.3 0.5 - 4.6 K/UL    Absolute Mono # 0.6 0.1 - 1.3 K/UL    Absolute Eos # 0.0 0.0 - 0.8 K/UL    Basophils Absolute 0.1 0.0 - 0.2 K/UL    Absolute Immature Granulocyte 0.1 0.0 - 0.5 K/UL   Basic Metabolic Panel   Result Value Ref Range    Sodium 138 133 - 143 mmol/L    Potassium 4.3 3.5 - 5.1 mmol/L    Chloride 107 98 - 107 mmol/L    CO2 19 (L) 21 - 32 mmol/L    Anion Gap 12 (H) 2 - 11 mmol/L    Glucose 119 (H) 65 - 100 mg/dL    BUN 11 7.0 - 18.0 MG/DL    Creatinine 0.58 (L) 0.6 - 1.0 MG/DL    Est, Glom Filt Rate >60 >60 ml/min/1.73m2    Calcium 9.3 8.3 - 10.4 MG/DL   C-Reactive Protein   Result Value Ref Range    CRP 0.3 0.0 - 0.9 mg/dL   Urinalysis, Micro   Result Value Ref Range    WBC, UA 0-3 0 /hpf    RBC, UA 0-3 0 /hpf    Epithelial Cells UA 0-3 0 /hpf    BACTERIA, URINE TRACE 0 /hpf    Casts 0 0 /lpf    Crystals 0 0 /LPF    Mucus, UA 0 0 /lpf    Other observations RESULTS VERIFIED MANUALLY          CT ABDOMEN PELVIS W IV CONTRAST Additional Contrast? Rectal   Final Result      1. Small 2 cm cystic lesion in the left adnexa with associated surrounding   moderate hemorrhagic fluid in the pelvis. Findings suspicious for ruptured   hemorrhagic ovarian cyst.      2. No renal calculus or hydronephrosis. 4. No evidence of colitis, diverticulitis, or bowel obstruction. Specifically,   no evidence of proctitis or perirectal abscess. Voice dictation software was used during the making of this note. This software is not perfect and grammatical and other typographical errors may be present. This note has not been completely proofread for errors.      Elizabeth Mcdonald, DO  12/20/22 1544

## 2022-12-20 NOTE — ED NOTES
I have reviewed discharge instructions with the patient. The patient verbalized understanding. Patient left ED via Discharge Method: ambulatory to Home with self. Opportunity for questions and clarification provided. Patient given 2 scripts. To continue your aftercare when you leave the hospital, you may receive an automated call from our care team to check in on how you are doing. This is a free service and part of our promise to provide the best care and service to meet your aftercare needs.  If you have questions, or wish to unsubscribe from this service please call [phone]. Thank you for Choosing our [facility] Emergency Department.       Patricio Eldridge RN  12/20/22 3883

## 2022-12-20 NOTE — ED TRIAGE NOTES
Ambulatory into triage, mask in place. Reports rectal pain, unable to sit down due to pain. Reports n/v. Last normal BM approx 0700 yesterday AM, reports feels as if needs to have BM and unable. Denies urinary symptoms, fever/chills.

## 2023-04-03 ENCOUNTER — HOSPITAL ENCOUNTER (EMERGENCY)
Age: 28
Discharge: HOME | End: 2023-04-03
Attending: EMERGENCY MEDICINE
Payer: MEDICAID

## 2023-04-03 VITALS
TEMPERATURE: 98.1 F | BODY MASS INDEX: 27.29 KG/M2 | HEIGHT: 63 IN | WEIGHT: 154 LBS | DIASTOLIC BLOOD PRESSURE: 75 MMHG | OXYGEN SATURATION: 98 % | HEART RATE: 80 BPM | RESPIRATION RATE: 18 BRPM | SYSTOLIC BLOOD PRESSURE: 122 MMHG

## 2023-04-03 DIAGNOSIS — R05.1 ACUTE COUGH: Primary | ICD-10-CM

## 2023-04-03 LAB
SARS-COV-2 RDRP RESP QL NAA+PROBE: NOT DETECTED
SOURCE: NORMAL

## 2023-04-03 PROCEDURE — 99282 EMERGENCY DEPT VISIT SF MDM: CPT

## 2023-04-03 PROCEDURE — 87635 SARS-COV-2 COVID-19 AMP PRB: CPT

## 2023-04-03 ASSESSMENT — LIFESTYLE VARIABLES
HOW MANY STANDARD DRINKS CONTAINING ALCOHOL DO YOU HAVE ON A TYPICAL DAY: PATIENT DOES NOT DRINK
HOW OFTEN DO YOU HAVE A DRINK CONTAINING ALCOHOL: NEVER

## 2023-04-03 ASSESSMENT — PAIN - FUNCTIONAL ASSESSMENT: PAIN_FUNCTIONAL_ASSESSMENT: NONE - DENIES PAIN

## 2023-04-03 NOTE — Clinical Note
Vale Tolentino was seen and treated in our emergency department on 4/3/2023. She may return to work on 04/03/2023. Patient had a negative COVID test here. She has been without fever for greater than 5 days. She may return to work with no precautions. If you have any questions or concerns, please don't hesitate to call.       KAELYN Jacobs

## 2023-04-03 NOTE — ED NOTES
I have reviewed discharge instructions with the patient. The patient verbalized understanding. Patient left ED via Discharge Method: ambulatory to Home. Opportunity for questions and clarification provided. Patient given 0 scripts. To continue your aftercare when you leave the hospital, you may receive an automated call from our care team to check in on how you are doing. This is a free service and part of our promise to provide the best care and service to meet your aftercare needs.  If you have questions, or wish to unsubscribe from this service please call [phone]. Thank you for Choosing our [facility] Emergency Department.         Joselyn Stein RN  04/03/23 8312

## 2023-04-03 NOTE — ED PROVIDER NOTES
diaphoretic. HENT:      Head: Normocephalic and atraumatic. Nose: Nose normal.      Mouth/Throat:      Mouth: Mucous membranes are moist.   Eyes:      Pupils: Pupils are equal, round, and reactive to light. Cardiovascular:      Rate and Rhythm: Normal rate. Pulmonary:      Effort: Pulmonary effort is normal. No respiratory distress. Abdominal:      General: Abdomen is flat. Palpations: Abdomen is soft. Tenderness: There is no abdominal tenderness. Musculoskeletal:         General: Normal range of motion. Cervical back: Normal range of motion. No rigidity. Skin:     General: Skin is warm. Neurological:      General: No focal deficit present. Mental Status: She is alert. Psychiatric:         Mood and Affect: Mood normal.        Procedures     Procedures     Orders Placed This Encounter   Procedures    COVID-19, Rapid        Medications - No data to display    New Prescriptions    No medications on file        Past Medical History:   Diagnosis Date    History of recurrent UTIs         Past Surgical History:   Procedure Laterality Date    HEENT          Results for orders placed or performed during the hospital encounter of 04/03/23   COVID-19, Rapid    Specimen: Nasopharyngeal   Result Value Ref Range    Source Nasopharyngeal      SARS-CoV-2, Rapid Not detected NOTD          No orders to display         Voice dictation software was used during the making of this note. This software is not perfect and grammatical and other typographical errors may be present. This note has not been completely proofread for errors.      Shanae Reama  04/03/23 1705

## 2023-04-19 ENCOUNTER — HOSPITAL ENCOUNTER (EMERGENCY)
Age: 28
Discharge: HOME | End: 2023-04-19
Attending: EMERGENCY MEDICINE
Payer: MEDICAID

## 2023-04-19 VITALS
HEART RATE: 96 BPM | SYSTOLIC BLOOD PRESSURE: 148 MMHG | DIASTOLIC BLOOD PRESSURE: 98 MMHG | WEIGHT: 154 LBS | RESPIRATION RATE: 18 BRPM | BODY MASS INDEX: 27.29 KG/M2 | TEMPERATURE: 98.2 F | OXYGEN SATURATION: 95 % | HEIGHT: 63 IN

## 2023-04-19 DIAGNOSIS — U09.9 POST COVID-19 CONDITION, UNSPECIFIED: Primary | ICD-10-CM

## 2023-04-19 DIAGNOSIS — J04.0 LARYNGITIS: ICD-10-CM

## 2023-04-19 PROCEDURE — 99284 EMERGENCY DEPT VISIT MOD MDM: CPT

## 2023-04-19 PROCEDURE — 6360000002 HC RX W HCPCS: Performed by: EMERGENCY MEDICINE

## 2023-04-19 PROCEDURE — 96372 THER/PROPH/DIAG INJ SC/IM: CPT

## 2023-04-19 RX ORDER — MELOXICAM 7.5 MG/1
7.5 TABLET ORAL 2 TIMES DAILY PRN
Qty: 20 TABLET | Refills: 0 | Status: SHIPPED | OUTPATIENT
Start: 2023-04-19

## 2023-04-19 RX ORDER — DROPERIDOL 2.5 MG/ML
2.5 INJECTION, SOLUTION INTRAMUSCULAR; INTRAVENOUS ONCE
Status: COMPLETED | OUTPATIENT
Start: 2023-04-19 | End: 2023-04-19

## 2023-04-19 RX ORDER — PREDNISONE 50 MG/1
50 TABLET ORAL DAILY
Qty: 5 TABLET | Refills: 0 | Status: SHIPPED | OUTPATIENT
Start: 2023-04-19 | End: 2023-04-24

## 2023-04-19 RX ORDER — METHYLPREDNISOLONE SODIUM SUCCINATE 125 MG/2ML
125 INJECTION, POWDER, LYOPHILIZED, FOR SOLUTION INTRAMUSCULAR; INTRAVENOUS ONCE
Status: COMPLETED | OUTPATIENT
Start: 2023-04-19 | End: 2023-04-19

## 2023-04-19 RX ORDER — KETOROLAC TROMETHAMINE 30 MG/ML
30 INJECTION, SOLUTION INTRAMUSCULAR; INTRAVENOUS ONCE
Status: COMPLETED | OUTPATIENT
Start: 2023-04-19 | End: 2023-04-19

## 2023-04-19 RX ADMIN — KETOROLAC TROMETHAMINE 30 MG: 30 INJECTION, SOLUTION INTRAMUSCULAR at 11:52

## 2023-04-19 RX ADMIN — DROPERIDOL 2.5 MG: 2.5 INJECTION, SOLUTION INTRAMUSCULAR; INTRAVENOUS at 11:52

## 2023-04-19 RX ADMIN — METHYLPREDNISOLONE SODIUM SUCCINATE 125 MG: 125 INJECTION, POWDER, FOR SOLUTION INTRAMUSCULAR; INTRAVENOUS at 11:51

## 2023-04-19 ASSESSMENT — ENCOUNTER SYMPTOMS
COUGH: 0
VOMITING: 0
SORE THROAT: 1
BACK PAIN: 0
VOICE CHANGE: 0
EYE PAIN: 0
FACIAL SWELLING: 0
ABDOMINAL PAIN: 0
CHEST TIGHTNESS: 0
SHORTNESS OF BREATH: 0
TROUBLE SWALLOWING: 0
NAUSEA: 0

## 2023-04-19 NOTE — ED TRIAGE NOTES
Pt states that she had covid about 3 weeks ago and has been having issues with sore throat, cough, body aches, and congestion ever since then. Pt states it gets worse when she lays down at night. Pt states that she lost her voice and it never really came back since then. Pt states OTC medications have not helped.

## 2023-04-19 NOTE — ED NOTES
I have reviewed discharge instructions with the patient. The patient verbalized understanding. Patient left ED via Discharge Method: ambulatory to Home with self. Opportunity for questions and clarification provided. Patient given 2 scripts. To continue your aftercare when you leave the hospital, you may receive an automated call from our care team to check in on how you are doing. This is a free service and part of our promise to provide the best care and service to meet your aftercare needs.  If you have questions, or wish to unsubscribe from this service please call [phone]. Thank you for Choosing our [facility] Emergency Department.         Benjamin Leos RN  04/19/23 4372

## 2023-04-19 NOTE — ED PROVIDER NOTES
Emergency Department Provider Note       PCP: None None   Age: 25 y.o. Sex: female     DISPOSITION Decision To Discharge 04/19/2023 01:05:21 PM       ICD-10-CM    1. Post covid-19 condition, unspecified  U09.9       2. Laryngitis  J04.0           Medical Decision Making       66-year-old female resents to the emergency department via private go with chief complaint of sore throat. Patient states that she is been diagnosed with COVID 3 weeks prior and has been feeling well ever since. She reports having headache, sore throat, change in her voice as well as body aches. Has been taking over-the-counter medicine with minimal relief. Vital signs are reviewed. Patient is in no acute distress. Patient was given symptomatic medicine and allowed to rest.  On reexamination the patient she is resting and sleeping stating her headache is completely resolved she feels much better. Patient be written for short course steroids and anti-inflammatories for home. Given return precautions related stable discharge condition        Complexity of Problems Addressed:  1 or more chronic illnesses with a severe exacerbation or progression. Data Reviewed and Analyzed:  Category 1:   I independently ordered and reviewed each unique test.  I reviewed external records: ED visit note from an outside group. I reviewed external records: provider visit note from PCP. I reviewed external records: provider visit note from outside specialist.       Category 2:   I interpreted the none. Category 3: Discussion of management or test interpretation. Discussed outpatient management and return cautions      Risk of Complications and/or Morbidity of Patient Management:  Shared medical decision making was utilized in creating the patients health plan today.          History      Velma Elizabeth is a 25 y.o. female who presents to the Emergency Department with chief complaint of    Chief Complaint   Patient presents with    Post-COVID

## 2023-04-19 NOTE — ED NOTES
I have reviewed discharge instructions with the patient. The patient verbalized understanding. Patient left ED via Discharge Method: ambulatory to Home with self. Opportunity for questions and clarification provided. Patient given 0 scripts. To continue your aftercare when you leave the hospital, you may receive an automated call from our care team to check in on how you are doing. This is a free service and part of our promise to provide the best care and service to meet your aftercare needs.  If you have questions, or wish to unsubscribe from this service please call [phone]. Thank you for Choosing our [facility] Emergency Department.         Tabatha Arora RN  04/19/23 6052

## 2023-12-15 ENCOUNTER — HOSPITAL ENCOUNTER (EMERGENCY)
Age: 28
Discharge: HOME | End: 2023-12-15
Attending: GENERAL PRACTICE

## 2023-12-15 VITALS
DIASTOLIC BLOOD PRESSURE: 93 MMHG | TEMPERATURE: 98.4 F | RESPIRATION RATE: 17 BRPM | BODY MASS INDEX: 26.57 KG/M2 | OXYGEN SATURATION: 97 % | SYSTOLIC BLOOD PRESSURE: 120 MMHG | HEART RATE: 90 BPM | WEIGHT: 150 LBS

## 2023-12-15 DIAGNOSIS — R11.0 NAUSEA: ICD-10-CM

## 2023-12-15 DIAGNOSIS — R53.1 GENERAL WEAKNESS: Primary | ICD-10-CM

## 2023-12-15 LAB
APPEARANCE UR: CLEAR
BILIRUB UR QL: NEGATIVE
COLOR UR: YELLOW
GLUCOSE UR STRIP.AUTO-MCNC: NEGATIVE MG/DL
HCG UR QL: NEGATIVE
HGB UR QL STRIP: NEGATIVE
KETONES UR QL STRIP.AUTO: NEGATIVE MG/DL
LEUKOCYTE ESTERASE UR QL STRIP.AUTO: NEGATIVE
NITRITE UR QL STRIP.AUTO: NEGATIVE
PH UR STRIP: 6.5 (ref 5–9)
PROT UR STRIP-MCNC: NEGATIVE MG/DL
SP GR UR REFRACTOMETRY: 1.02 (ref 1–1.02)
UROBILINOGEN UR QL STRIP.AUTO: 0.2 EU/DL (ref 0.2–1)

## 2023-12-15 PROCEDURE — 99283 EMERGENCY DEPT VISIT LOW MDM: CPT

## 2023-12-15 PROCEDURE — 81003 URINALYSIS AUTO W/O SCOPE: CPT

## 2023-12-15 PROCEDURE — 81025 URINE PREGNANCY TEST: CPT

## 2023-12-15 ASSESSMENT — PAIN - FUNCTIONAL ASSESSMENT: PAIN_FUNCTIONAL_ASSESSMENT: NONE - DENIES PAIN

## 2023-12-15 NOTE — ED NOTES
Pt requesting to be d/c'd states that she has an appt with her PMD on Monday and will wait to be seen then.   Dr Jerri Rushing made aware     Loco Walsh RN  12/15/23 [id number]

## 2023-12-15 NOTE — ED NOTES
Pt states that she has had nausea and fatigue for several days. States that she has not started her period for this month and is concerned that she may be pregnant. Denies any pain with urination but has lower back pain. Has been drinking cranberry juice.      Ritu Winston RN  12/15/23 9838

## 2023-12-15 NOTE — ED TRIAGE NOTES
Pt came into ED stating she hasnt had period since end of Oct, states shes taken a test in Nov and it was neg, c/o fatigue, nausea in the morning.

## 2024-03-25 NOTE — ED NOTES
I have reviewed discharge instructions with the patient. The patient verbalized understanding. Patient left ED via Discharge Method: ambulatory to Home with self. Opportunity for questions and clarification provided. Patient given 2 scripts.
No

## 2024-11-08 ENCOUNTER — HOSPITAL ENCOUNTER (EMERGENCY)
Age: 29
Discharge: HOME | End: 2024-11-08
Attending: STUDENT IN AN ORGANIZED HEALTH CARE EDUCATION/TRAINING PROGRAM

## 2024-11-08 ENCOUNTER — APPOINTMENT (OUTPATIENT)
Dept: GENERAL RADIOLOGY | Age: 29
End: 2024-11-08

## 2024-11-08 VITALS
DIASTOLIC BLOOD PRESSURE: 80 MMHG | OXYGEN SATURATION: 97 % | HEART RATE: 92 BPM | SYSTOLIC BLOOD PRESSURE: 113 MMHG | WEIGHT: 165 LBS | TEMPERATURE: 99 F | HEIGHT: 63 IN | BODY MASS INDEX: 29.23 KG/M2 | RESPIRATION RATE: 18 BRPM

## 2024-11-08 DIAGNOSIS — J06.9 UPPER RESPIRATORY TRACT INFECTION, UNSPECIFIED TYPE: Primary | ICD-10-CM

## 2024-11-08 DIAGNOSIS — R11.0 NAUSEA: ICD-10-CM

## 2024-11-08 LAB
EKG ATRIAL RATE: 91 BPM
EKG DIAGNOSIS: NORMAL
EKG P AXIS: 47 DEGREES
EKG P-R INTERVAL: 130 MS
EKG Q-T INTERVAL: 349 MS
EKG QRS DURATION: 79 MS
EKG QTC CALCULATION (BAZETT): 427 MS
EKG R AXIS: 52 DEGREES
EKG T AXIS: 42 DEGREES
EKG VENTRICULAR RATE: 90 BPM
FLUAV RNA SPEC QL NAA+PROBE: NOT DETECTED
FLUBV RNA SPEC QL NAA+PROBE: NOT DETECTED
HCG UR QL: NEGATIVE
SARS-COV-2 RDRP RESP QL NAA+PROBE: NOT DETECTED
SOURCE: NORMAL
STREP, MOLECULAR: NOT DETECTED

## 2024-11-08 PROCEDURE — 99285 EMERGENCY DEPT VISIT HI MDM: CPT

## 2024-11-08 PROCEDURE — 87651 STREP A DNA AMP PROBE: CPT

## 2024-11-08 PROCEDURE — 93005 ELECTROCARDIOGRAM TRACING: CPT | Performed by: STUDENT IN AN ORGANIZED HEALTH CARE EDUCATION/TRAINING PROGRAM

## 2024-11-08 PROCEDURE — 6370000000 HC RX 637 (ALT 250 FOR IP): Performed by: STUDENT IN AN ORGANIZED HEALTH CARE EDUCATION/TRAINING PROGRAM

## 2024-11-08 PROCEDURE — 87502 INFLUENZA DNA AMP PROBE: CPT

## 2024-11-08 PROCEDURE — 71045 X-RAY EXAM CHEST 1 VIEW: CPT

## 2024-11-08 PROCEDURE — 81025 URINE PREGNANCY TEST: CPT

## 2024-11-08 PROCEDURE — 87635 SARS-COV-2 COVID-19 AMP PRB: CPT

## 2024-11-08 PROCEDURE — 93010 ELECTROCARDIOGRAM REPORT: CPT | Performed by: INTERNAL MEDICINE

## 2024-11-08 RX ORDER — PREDNISONE 20 MG/1
20 TABLET ORAL DAILY
Qty: 7 TABLET | Refills: 0 | Status: SHIPPED | OUTPATIENT
Start: 2024-11-08 | End: 2024-11-15

## 2024-11-08 RX ORDER — ONDANSETRON 4 MG/1
4 TABLET, ORALLY DISINTEGRATING ORAL ONCE
Status: COMPLETED | OUTPATIENT
Start: 2024-11-08 | End: 2024-11-08

## 2024-11-08 RX ORDER — BROMPHENIRAMINE MALEATE, PSEUDOEPHEDRINE HYDROCHLORIDE, AND DEXTROMETHORPHAN HYDROBROMIDE 2; 30; 10 MG/5ML; MG/5ML; MG/5ML
2.5 SYRUP ORAL 4 TIMES DAILY PRN
Qty: 118 ML | Refills: 0 | Status: SHIPPED | OUTPATIENT
Start: 2024-11-08

## 2024-11-08 RX ORDER — ONDANSETRON 4 MG/1
4 TABLET, FILM COATED ORAL 3 TIMES DAILY PRN
Qty: 15 TABLET | Refills: 0 | Status: SHIPPED | OUTPATIENT
Start: 2024-11-08

## 2024-11-08 RX ADMIN — ONDANSETRON 4 MG: 4 TABLET, ORALLY DISINTEGRATING ORAL at 01:58

## 2024-11-08 ASSESSMENT — PAIN - FUNCTIONAL ASSESSMENT: PAIN_FUNCTIONAL_ASSESSMENT: 0-10

## 2024-11-08 ASSESSMENT — PAIN SCALES - GENERAL: PAINLEVEL_OUTOF10: 10

## 2024-11-08 NOTE — ED TRIAGE NOTES
Pt ambulatory to triage with steady gait by self. PT tearful. Pt c/o body aches, N/V/D, sore throat, chest \"burning\", productive cough, fever. PT reports symptom onset Sunday. Pt reports she's been taking tylenol, ibuprofen with little relief.

## 2024-11-08 NOTE — ED PROVIDER NOTES
[facility]  Free-standing Emergency Department    DISPOSITION Decision To Discharge 11/08/2024 02:14:02 AM     ICD-10-CM    1. Upper respiratory tract infection, unspecified type  J06.9       2. Nausea  R11.0         New Prescriptions    BROMPHENIRAMINE-PSEUDOEPHEDRINE-DM 2-30-10 MG/5ML SYRUP    Take 2.5 mLs by mouth 4 times daily as needed for Cough or Congestion    ONDANSETRON (ZOFRAN) 4 MG TABLET    Take 1 tablet by mouth 3 times daily as needed for Nausea or Vomiting    PREDNISONE (DELTASONE) 20 MG TABLET    Take 1 tablet by mouth daily for 7 days     ED Course     ED Course as of 11/08/24 0237   Fri Nov 08, 2024   0147 29-year-old female presents with a 5-day history of upper respiratory symptoms.  Tachycardic, but normal heart rate during my examination, otherwise vitals reassuring; afebrile.  COVID-19, strep, flu negative.  Will obtain x-ray and EKG [ER]   0211 EKG: Normal sinus rhythm, rate 90.  No STEMI.  Normal axis and intervals.  Time: 0152 [ER]   0215 Swabs negative.  EKG without evidence of pneumonia per my read.  Will start on symptomatic management including decongestant, steroids, antiemetic.  Return precautions given [ER]      ED Course User Index  [ER] Stephon Daniel MD     Data Reviewed and Analyzed:  1 acute illness with systemic symptoms.  Prescription drug management performed.    I independently ordered and reviewed each unique test.   I interpreted the X-rays no obvious pneumonia.  The patient has an Upper Respiratory Infection.  Antibiotics were not prescribed.       HPI   Iliana Pérez is a 29 y.o. female with a history of none who presents to the ED with complaint of viral symptoms.  Reports a 5-day history of progressively worsening upper respiratory symptoms including sore throat, rhinorrhea, cough, chest congestion.  States he is not having pain in her chest primarily with coughing episodes.  States she felt she could not breathe much tonight when

## 2024-11-08 NOTE — DISCHARGE INSTRUCTIONS
You may take the medication as directed.  Be sure to drink plenty of fluids to stay well-hydrated.  I recommend taking Tylenol as/or Motrin every 6-8 hours as needed for any body aches, headache, fever.  Return to the ER if any new or worsening symptoms